# Patient Record
Sex: FEMALE | Race: WHITE | Employment: UNEMPLOYED | URBAN - NONMETROPOLITAN AREA
[De-identification: names, ages, dates, MRNs, and addresses within clinical notes are randomized per-mention and may not be internally consistent; named-entity substitution may affect disease eponyms.]

---

## 2019-08-19 ENCOUNTER — TELEPHONE (OUTPATIENT)
Dept: NEUROSURGERY | Age: 44
End: 2019-08-19

## 2019-08-20 ENCOUNTER — OFFICE VISIT (OUTPATIENT)
Dept: NEUROLOGY | Age: 44
End: 2019-08-20
Payer: MEDICAID

## 2019-08-20 VITALS
BODY MASS INDEX: 27.72 KG/M2 | HEIGHT: 71 IN | HEART RATE: 82 BPM | WEIGHT: 198 LBS | SYSTOLIC BLOOD PRESSURE: 116 MMHG | DIASTOLIC BLOOD PRESSURE: 73 MMHG

## 2019-08-20 DIAGNOSIS — R11.0 NAUSEA: ICD-10-CM

## 2019-08-20 DIAGNOSIS — G43.909 MIGRAINE WITHOUT STATUS MIGRAINOSUS, NOT INTRACTABLE, UNSPECIFIED MIGRAINE TYPE: Primary | ICD-10-CM

## 2019-08-20 DIAGNOSIS — H53.149 PHOTOPHOBIA: ICD-10-CM

## 2019-08-20 DIAGNOSIS — M54.2 PAIN, NECK: ICD-10-CM

## 2019-08-20 PROCEDURE — 99204 OFFICE O/P NEW MOD 45 MIN: CPT | Performed by: PSYCHIATRY & NEUROLOGY

## 2019-08-20 RX ORDER — DIAZEPAM 10 MG/1
5 TABLET ORAL
COMMUNITY
End: 2022-04-19 | Stop reason: ALTCHOICE

## 2019-08-20 RX ORDER — MONTELUKAST SODIUM 4 MG/1
TABLET, CHEWABLE ORAL
COMMUNITY
End: 2019-08-20

## 2019-08-20 NOTE — PROGRESS NOTES
Chief Complaint   Patient presents with    New Patient     Referred by Ping Ventura for migraines and light sensitive        St. Landryanuradha Fried is a 45y.o. year old female who is seen for evaluation of seen for intractable migraines The patient indicates that she has had migraines since her teens. Over time they have worsened. She indicates since about 2017 her headaches have significantly worsened. She does have significant stress with her kids with genetic disorders and she has been diagnosed with PTSD. She indicates she will get a severe pain over the right eye and orbit and feels like someone is stabbing her. She then has a visual aura of stars and strips which encompasses her visual field and then she loses her peripheral vision. She then has a severe migraine that lasts from 1/2 day to 4 days. She has significant photophobia all the time. She has chronic neck pain as well. She has had an MRI in 2014 and 2019 which had some minimal non specific white matter change. In 2014 she went to [facility] for evaluation of MS has her MRI had the non specific white matter change. Her workup at that time including LP was reportedly unremarkable. She has tried Maxalt which made her migraine 10 x worse. She has tried Topamax and  elavil . Active Ambulatory Problems     Diagnosis Date Noted    Migraine without status migrainosus, not intractable 08/20/2019    Photophobia 08/20/2019    Pain, neck 08/20/2019    Nausea 08/20/2019     Resolved Ambulatory Problems     Diagnosis Date Noted    No Resolved Ambulatory Problems     Past Medical History:   Diagnosis Date    Diabetes ([address]     [address]us [address] [address] [facility]     Migraine        Past Surgical History:   Procedure Laterality Date    CHOLECYSTECTOMY         History reviewed. No pertinent family history.     Allergies   Allergen Reactions    Codeine        Social History     Socioeconomic History    Marital status: Unknown     Spouse name: Not on file    Number of children: and lower extremities bilaterally    Reflexes   2+ biceps bilaterally  2+ brachioradialis  2+patella  2+ ankle jerks  No clonus ankles  No Redd's sign bilateral hands    Tremors- no tremors in hands or head noted    Gait  Normal base and speed  No ataxia    Coordination  Finger to nose-unremarkable    No results found for: CBFQZBNA12  No results found for: WBC, HGB, HCT, MCV, PLT  No results found for: NA, K, CL, CO2, BUN, CREATININE, GLUCOSE, CALCIUM, PROT, LABALBU, BILITOT, ALKPHOS, AST, ALT, LABGLOM, GFRAA, AGRATIO, GLOB        Assessment    ICD-10-CM    1. Migraine without status migrainosus, not intractable, unspecified migraine type G43.909    2. Photophobia H53.149    3. Pain, neck M54.2    4. Nausea R11.0        Her neurological examination today was unremarkable. Her MRI's of the brain which I reviewed had some minimal non specific white matter change. At this time she will be tried on Aimovig. If not covered will go to Botox next followed by nerve block and medications. She is to follow up with me in one month and call with any issues. Plan    No orders of the defined types were placed in this encounter. Orders Placed This Encounter   Medications    Erenumab-aooe (AIMOVIG) 140 MG/ML SOAJ     Sig: Inject 140 mg into the skin every 30 days     Dispense:  1 pen     Refill:  11       Return in about 3 months (around 11/20/2019). EMR Dragon/transcription disclaimer:Significant part of this  encounter note is electronic transcription/translation of spoken language to printed text. The electronic translation of spoken language may be erroneous, or at times, nonsensical words or phrases may be inadvertently transcribed.  Although I have reviewed the note for such errors, some may still exist.

## 2020-01-07 ENCOUNTER — TELEPHONE (OUTPATIENT)
Dept: NEUROLOGY | Age: 45
End: 2020-01-07

## 2020-01-07 ENCOUNTER — OFFICE VISIT (OUTPATIENT)
Dept: NEUROLOGY | Age: 45
End: 2020-01-07
Payer: MEDICAID

## 2020-01-07 VITALS
BODY MASS INDEX: 30.1 KG/M2 | DIASTOLIC BLOOD PRESSURE: 76 MMHG | HEART RATE: 76 BPM | WEIGHT: 215 LBS | HEIGHT: 71 IN | SYSTOLIC BLOOD PRESSURE: 108 MMHG

## 2020-01-07 PROCEDURE — 99214 OFFICE O/P EST MOD 30 MIN: CPT | Performed by: PSYCHIATRY & NEUROLOGY

## 2020-01-07 NOTE — TELEPHONE ENCOUNTER
Mirella Schulz (Kermit Arminda)   Rx #: [id number]   Emgality 120MG/ML auto-injectors (migraine)   Form  [facility] Electronic PA Form ([facility])   Created   22 minutes ago   Sent to Plan   6 minutes ago   Plan Response   5 minutes ago   Submit Clinical Questions   1 minute ago   Determination   Wait for Determination  Please wait for [facility] to return a determination.

## 2020-01-07 NOTE — PROGRESS NOTES
Chief Complaint   Patient presents with    Migraine     5 month follow up, pt wants to talk about botox or Heraclio Joanne is a 45y.o. year old female who is seen for evaluation of seen for intractable migraines The patient indicates that she has had migraines since her teens. Over time they have worsened. She indicates since about 2017 her headaches have significantly worsened. She does have significant stress with her kids with genetic disorders and she has been diagnosed with PTSD. She indicates she will get a severe pain over the right eye and orbit and feels like someone is stabbing her. She then has a visual aura of stars and strips which encompasses her visual field and then she loses her peripheral vision. She then has a severe migraine that lasts from 1/2 day to 4 days. She has significant photophobia all the time. She has chronic neck pain as well. She has had an MRI in 2014 and 2019 which had some minimal non specific white matter change. In 2014 she went to [facility] for evaluation of MS has her MRI had the non specific white matter change. Her workup at that time including LP was reportedly unremarkable. She has tried Maxalt which made her migraine 10 x worse. She has tried Topamax and  elavil . 16 days a month bad headaches. Samples of Emgality helped reduced intensity. Active Ambulatory Problems     Diagnosis Date Noted    Migraine without status migrainosus, not intractable 08/20/2019    Photophobia 08/20/2019    Pain, neck 08/20/2019    Nausea 08/20/2019     Resolved Ambulatory Problems     Diagnosis Date Noted    No Resolved Ambulatory Problems     Past Medical History:   Diagnosis Date    Diabetes ([address]     [facility]us [address] [address]     Migraine        Past Surgical History:   Procedure Laterality Date    CHOLECYSTECTOMY         History reviewed. No pertinent family history.     Allergies   Allergen Reactions    Codeine        Social History     Socioeconomic History    Marital status: Unknown     Spouse name: Not on file    Number of children: Not on file    Years of education: Not on file    Highest education level: Not on file   Occupational History    Not on file   Social Needs    Financial resource strain: Not on file    Food insecurity:     Worry: Not on file     Inability: Not on file    Transportation needs:     Medical: Not on file     Non-medical: Not on file   Tobacco Use    Smoking status: Current Every Day Smoker     Packs/day: 0.25    Smokeless tobacco: Never Used   Substance and Sexual Activity    Alcohol use: Yes    Drug use: Not on file    Sexual activity: Not on file   Lifestyle    Physical activity:     Days per week: Not on file     Minutes per session: Not on file    Stress: Not on file   Relationships    Social connections:     Talks on phone: Not on file     Gets together: Not on file     Attends [employer] service: Not on file     Active member of club or organization: Not on file     Attends meetings of clubs or organizations: Not on file     Relationship status: Not on file    Intimate partner violence:     Fear of current or ex partner: Not on file     Emotionally abused: Not on file     Physically abused: Not on file     Forced sexual activity: Not on file   Other Topics Concern    Not on file   Social History Narrative    Not on file       Review of Systems     Constitutional - No fever or chills. yes diaphoresis or significant fatigue. HENT -  No tinnitus or significant hearing loss. Eyes - yes sudden vision change or eye pain  Respiratory - no significant shortness of breath or cough  Cardiovascular - no chest pain No palpitations or significant leg swelling  Gastrointestinal - no abdominal swelling or pain. Genitourinary - No difficulty urinating, dysuria  Musculoskeletal - no back pain or myalgia. Skin - no color change or rash  Neurologic - No seizures. No lateralizing weakness.   Hematologic - no easy bruising or excessive

## 2020-03-09 ENCOUNTER — HOSPITAL ENCOUNTER (OUTPATIENT)
Dept: PAIN MANAGEMENT | Age: 45
Discharge: HOME | End: 2020-03-09
Payer: MEDICAID

## 2020-03-09 VITALS
DIASTOLIC BLOOD PRESSURE: 71 MMHG | TEMPERATURE: 97 F | SYSTOLIC BLOOD PRESSURE: 120 MMHG | HEART RATE: 76 BPM | RESPIRATION RATE: 18 BRPM | OXYGEN SATURATION: 98 %

## 2020-03-09 PROCEDURE — 6360000002 HC RX W HCPCS

## 2020-03-09 PROCEDURE — 64615 CHEMODENERV MUSC MIGRAINE: CPT

## 2020-03-09 PROCEDURE — 64615 CHEMODENERV MUSC MIGRAINE: CPT | Performed by: PSYCHIATRY & NEUROLOGY

## 2020-04-02 ENCOUNTER — TELEPHONE (OUTPATIENT)
Dept: NEUROLOGY | Age: 45
End: 2020-04-02

## 2020-06-01 ENCOUNTER — HOSPITAL ENCOUNTER (OUTPATIENT)
Dept: PAIN MANAGEMENT | Age: 45
Discharge: HOME | End: 2020-06-01
Payer: COMMERCIAL

## 2020-06-01 VITALS
OXYGEN SATURATION: 97 % | TEMPERATURE: 97.5 F | HEART RATE: 77 BPM | SYSTOLIC BLOOD PRESSURE: 112 MMHG | DIASTOLIC BLOOD PRESSURE: 69 MMHG | RESPIRATION RATE: 18 BRPM

## 2020-06-01 PROCEDURE — 64615 CHEMODENERV MUSC MIGRAINE: CPT

## 2020-06-01 PROCEDURE — 64615 CHEMODENERV MUSC MIGRAINE: CPT | Performed by: PSYCHIATRY & NEUROLOGY

## 2020-06-01 RX ORDER — PRAZOSIN HYDROCHLORIDE 1 MG/1
1 CAPSULE ORAL NIGHTLY
COMMUNITY
End: 2021-04-16

## 2020-06-01 NOTE — PROCEDURES
[address]    Patient Name: Claudetta Flemings    : 1975                    Age: 45 y.o. Sex: female    Date: 2020    Pre-op Diagnosis: Chronic Migraines    Post-op Diagnosis: Chronic Migraines    Procedure: Botox injections x 155 units (45 units wasted) for migraine    Performing Procedure:  Dr Merlyn Rice    Patient Vitals for the past 24 hrs:   BP Temp Temp src Pulse Resp SpO2   20 1115 112/69 97.5 °F (36.4 °C) Temporal 77 18 97 %       Previous Injections:  Patient had 9 headaches/migraines over the past month. Indications:    Claudetta Flemings has been treated for problems with chronic migraine headaches. The patient was taken through a conservative course of treatment without complete resolution of symptoms. Botox injection therapy was offered and after the risks and benefits of the procedure were explained to the patient, we had agreed to proceed. The patient was taken to the procedure room and placed in the seated position. The following muscles were identified using palpation and standard landmarks. These muscles were marked and prepped with alcohol. A total of 155 units were injected after careful aspiration and the following distribution bilaterally:  10 units in 2 sites, procerus 5 units in one site, frontalis 20 units in 4 sites, temporalis 40 unit in 8 sites, occipitals 30 units in 6 sites, cervical paraspinals 20 units in 4 sites, and trapezius 30 units in 6 sites. Discharge: The patient tolerated the procedure well. There were no complications during the procedure and the patient was discharged home with discharge instructions. The patient has been instructed to contact the office should there be any complications or questions to arise between today and their next appointment.     Plan:  [] Will return to the office in   [] 1 month  [] 4 - 6 weeks  [] 8 weeks   [x] 12 weeks:  [x] Procedure Follow-up [] Office Visit      Eun Blackwell MD, 6/1/2020 at 11:42 AM

## 2020-08-25 ENCOUNTER — TELEPHONE (OUTPATIENT)
Dept: NEUROLOGY | Age: 45
End: 2020-08-25

## 2020-08-25 RX ORDER — OXYCODONE HYDROCHLORIDE 15 MG/1
15 TABLET ORAL EVERY 4 HOURS PRN
Qty: 15 TABLET | Refills: 0 | Status: SHIPPED | OUTPATIENT
Start: 2020-08-25 | End: 2020-09-24

## 2020-08-27 RX ORDER — ONABOTULINUMTOXINA 200 [USP'U]/1
INJECTION, POWDER, LYOPHILIZED, FOR SOLUTION INTRADERMAL; INTRAMUSCULAR
Qty: 155 UNITS | Refills: 3 | Status: SHIPPED | OUTPATIENT
Start: 2020-08-27 | End: 2022-01-07

## 2020-08-31 ENCOUNTER — HOSPITAL ENCOUNTER (OUTPATIENT)
Dept: PAIN MANAGEMENT | Age: 45
Discharge: HOME | End: 2020-08-31
Payer: COMMERCIAL

## 2020-08-31 ENCOUNTER — TELEPHONE (OUTPATIENT)
Dept: PAIN MANAGEMENT | Age: 45
End: 2020-08-31

## 2020-08-31 PROCEDURE — 64615 CHEMODENERV MUSC MIGRAINE: CPT

## 2020-08-31 PROCEDURE — 64615 CHEMODENERV MUSC MIGRAINE: CPT | Performed by: PSYCHIATRY & NEUROLOGY

## 2020-08-31 RX ORDER — CHLORPROMAZINE HYDROCHLORIDE 25 MG/1
25 TABLET, FILM COATED ORAL EVERY EVENING
Qty: 30 TABLET | Refills: 5 | Status: SHIPPED | OUTPATIENT
Start: 2020-08-31

## 2020-08-31 RX ORDER — DIPHENHYDRAMINE HCL 25 MG
25 CAPSULE ORAL NIGHTLY PRN
Qty: 30 CAPSULE | Refills: 5 | Status: SHIPPED | OUTPATIENT
Start: 2020-08-31 | End: 2020-09-30

## 2020-08-31 RX ORDER — GALCANEZUMAB 120 MG/ML
120 INJECTION, SOLUTION SUBCUTANEOUS
Qty: 1 SYRINGE | Refills: 11 | Status: SHIPPED | OUTPATIENT
Start: 2020-08-31 | End: 2022-04-19 | Stop reason: ALTCHOICE

## 2020-08-31 RX ORDER — PROMETHAZINE HYDROCHLORIDE 25 MG/1
25 TABLET ORAL EVERY EVENING
Qty: 30 TABLET | Refills: 5 | Status: SHIPPED | OUTPATIENT
Start: 2020-08-31 | End: 2020-09-30

## 2020-08-31 NOTE — TELEPHONE ENCOUNTER
Call placed to [facility] in regards to patient's delivery for Botox injection. Per Ray patient has copay of 300.00 and they will need to still reach out to patient to set up new patient packet.  Patient has been scheduled for 08/31/2020 with use of a sample, will give patient Botox information to set up for reimbursement via Botox program.

## 2020-08-31 NOTE — PROCEDURES
[address]    Patient Name: Rachel Zapien    : 1975                    Age: 39 y.o. Sex: female    Date: 2020    Pre-op Diagnosis: Chronic Migraines    Post-op Diagnosis: Chronic Migraines    Procedure: Botox injections x 155 units (45 units wasted) for migraine    Performing Procedure:  Dr Naomi Arrieta    No data found. Previous Injections:  Patient had 23  headaches/migraines over the past month. Indications:    Rachel Zapien has been treated for problems with chronic migraine headaches. The patient was taken through a conservative course of treatment without complete resolution of symptoms. Botox injection therapy was offered and after the risks and benefits of the procedure were explained to the patient, we had agreed to proceed. The patient was taken to the procedure room and placed in the seated position. The following muscles were identified using palpation and standard landmarks. These muscles were marked and prepped with alcohol. A total of 155 units were injected after careful aspiration and the following distribution bilaterally:  10 units in 2 sites, procerus 5 units in one site, frontalis 20 units in 4 sites, temporalis 40 unit in 8 sites, occipitals 30 units in 6 sites, cervical paraspinals 20 units in 4 sites, and trapezius 30 units in 6 sites. Discharge: The patient tolerated the procedure well. There were no complications during the procedure and the patient was discharged home with discharge instructions. The patient has been instructed to contact the office should there be any complications or questions to arise between today and their next appointment.     Plan:  [] Will return to the office in   [] 1 month  [] 4 - 6 weeks  [] 8 weeks   [x] 12 weeks:  [x] Procedure Follow-up   [] Office Visit      Naomi Arrieta MD, 2020 at 11:27 AM

## 2020-08-31 NOTE — PROGRESS NOTES
Procedure:  Level of Consciousness: [x]Alert [x]Oriented []Disoriented []Lethargic  Anxiety Level: [x]Calm []Anxious []Depressed []Other  Skin: [x]Warm [x]Dry []Cool []Moist []Intact []Other  Cardiovascular: [x]Palpitations: [x]Never []Occasionally []Frequently  Chest Pain: [x]No []Yes  Respiratory:  [x]Unlabored []Labored []Cough ([] Productive []Unproductive)  HCG Required: [x]No []Yes   Results: []Negative []Positive  Knowledge Level:        [x]Patient/Other verbalized understanding of pre-procedure instructions. [x]Assessment of post-op care needs (transportation, responsible caregiver)        [x]Able to discuss health care problems and how to deal with it. Factors that Affect Teaching:        Language Barrier: [x]No []Yes - why:        Hearing Loss:        [x]No []Yes            Corrective Device:  []Yes []No        Vision Loss:           [x]No []Yes            Corrective Device:  []Yes []No        Memory Loss:       [x]No []Yes            []Short Term []Long Term  Motivational Level:  [x]Asks Questions                  []Extremely Anxious       [x]Seems Interested               []Seems Uninterested                  [x]Denies need for Education  Risk for Injury:  [x]Patient oriented to person, place and time  []History of frequent falls/loss of balance  Nutritional:  []Change in appetite   []Weight Gain   []Weight Loss  Functional:  []Requires assistance with ADL's      Botox Assessment  [x] Patient  has had a reduction of at least 100 hours (5 Days) in Migraines since receiving Botox  []  []  []  Factors that Affect Teaching:  Assessment of post-op care needs (transportation, responsible caregiver)        []Able to discuss health care problems and how to deal with it.   Factors that Affect Teaching:

## 2020-11-19 ENCOUNTER — TELEPHONE (OUTPATIENT)
Dept: PAIN MANAGEMENT | Age: 45
End: 2020-11-19

## 2020-11-19 NOTE — TELEPHONE ENCOUNTER
Call was placed to the patient in regards to her Botox appointment, patient wants to reschedule appointment for 11/30/2020. Patient given information to contact [facility] to schedule delivery for Botox for this appointment.

## 2020-12-07 ENCOUNTER — TELEPHONE (OUTPATIENT)
Dept: PAIN MANAGEMENT | Age: 45
End: 2020-12-07

## 2020-12-07 NOTE — TELEPHONE ENCOUNTER
Call placed to the patient in regards to patient's Botox appointments. Patient informed that she may qualify for financial assistance will mail information via [employer] and reach out to Botox to see if there is program for patient to maintain getting treatments.

## 2021-02-08 ENCOUNTER — TELEPHONE (OUTPATIENT)
Dept: NEUROLOGY | Age: 46
End: 2021-02-08

## 2021-02-22 ENCOUNTER — TELEPHONE (OUTPATIENT)
Dept: NEUROLOGY | Age: 46
End: 2021-02-22

## 2021-02-22 NOTE — TELEPHONE ENCOUNTER
Milagro requests that nurse return their call. The best time to reach her is anytime after 12 pm.    Pt called needing to discuss Botox, Emgality and PA required. Pt is having increase in migraines and would like to discuss options. Please contact to discuss. Thank you.

## 2021-02-25 NOTE — TELEPHONE ENCOUNTER
Called patient back to see what I could help her with, she is beeding a sooner follow up per migraines daily. She is also going to get the [facility] that is ready at the pharmacy. patient stated that shehas been trying to reach our office for months.  The only telephone encounter was from August. Patient is scheduled fo r4-16 to see Dr Emeli Giordano

## 2021-04-13 ENCOUNTER — TELEPHONE (OUTPATIENT)
Dept: PAIN MANAGEMENT | Age: 46
End: 2021-04-13

## 2021-04-13 NOTE — TELEPHONE ENCOUNTER
Called patient to verify insurance-Unable to reach anyone at the [address] [place] [address] number. She informed me to use [facility].  Called [facility] and they had an approval on file but they no longer deliver botox. I inquired about other pharmacies and was given [facility] among others-they were unable to tell me which plan was the cheapest.  I called and gave a verbal order for the rx for the Botox and called patient to let her know that they would be calling her for her approval/copay. She verbalized understanding.

## 2021-04-16 ENCOUNTER — TELEPHONE (OUTPATIENT)
Dept: NEUROLOGY | Age: 46
End: 2021-04-16

## 2021-04-16 ENCOUNTER — OFFICE VISIT (OUTPATIENT)
Dept: NEUROLOGY | Age: 46
End: 2021-04-16
Payer: COMMERCIAL

## 2021-04-16 VITALS
HEART RATE: 79 BPM | WEIGHT: 215 LBS | HEIGHT: 71 IN | BODY MASS INDEX: 30.1 KG/M2 | SYSTOLIC BLOOD PRESSURE: 122 MMHG | DIASTOLIC BLOOD PRESSURE: 67 MMHG

## 2021-04-16 DIAGNOSIS — H53.149 PHOTOPHOBIA: ICD-10-CM

## 2021-04-16 DIAGNOSIS — R11.0 NAUSEA: ICD-10-CM

## 2021-04-16 DIAGNOSIS — G43.719 INTRACTABLE CHRONIC MIGRAINE WITHOUT AURA AND WITHOUT STATUS MIGRAINOSUS: Primary | ICD-10-CM

## 2021-04-16 DIAGNOSIS — M54.2 PAIN, NECK: ICD-10-CM

## 2021-04-16 PROCEDURE — 99214 OFFICE O/P EST MOD 30 MIN: CPT | Performed by: PSYCHIATRY & NEUROLOGY

## 2021-04-16 RX ORDER — OXCARBAZEPINE 150 MG/1
150 TABLET, FILM COATED ORAL 2 TIMES DAILY
Qty: 60 TABLET | Refills: 2 | Status: SHIPPED | OUTPATIENT
Start: 2021-04-16 | End: 2022-04-19 | Stop reason: ALTCHOICE

## 2021-04-16 NOTE — PROGRESS NOTES
Chief Complaint   Patient presents with    Migraine     3 month follow up, pt states she has been having trouble with her memory lately. she states she is losing time and days    Other     patient is very tearful at her appointment and has constant headaches and is in the bed all the time, pt states she can't be in the sunlight without being in pain         Barbra Muñoz is a 39y.o. year old female who is seen for evaluation of seen for intractable migraines The patient indicates that she has had migraines since her teens. Over time they have worsened. She indicates since about 2017 her headaches have significantly worsened. She does have significant stress with her kids with genetic disorders and she has been diagnosed with PTSD. She indicates she will get a severe pain over the right eye and orbit and feels like someone is stabbing her. She then has a visual aura of stars and strips which encompasses her visual field and then she loses her peripheral vision. She then has a severe migraine that lasts from 1/2 day to 4 days. She has significant photophobia all the time. She has chronic neck pain as well. She has had an MRI in 2014 and 2019 which had some minimal non specific white matter change. In 2014 she went to [facility] for evaluation of MS has her MRI had the non specific white matter change. Her workup at that time including LP was reportedly unremarkable. She has tried Maxalt which made her migraine 10 x worse. She has tried Topamax and  elavil . 16 days a month bad headaches. Samples of Emgality helped reduced intensity. Daily headaches but not last as long. Lost track of time with headaches. Cocktail and Roxocodone helped. Had to call crisis center. Gets sudden sharp pain brief.      Active Ambulatory Problems     Diagnosis Date Noted    Migraine without status migrainosus, not intractable 08/20/2019    Photophobia 08/20/2019    Pain, neck 08/20/2019    Nausea 08/20/2019    Intractable chronic migraine without aura and without status migrainosus 04/16/2021     Resolved Ambulatory Problems     Diagnosis Date Noted    No Resolved Ambulatory Problems     Past Medical History:   Diagnosis Date    Diabetes ([facility] [address]     Lupus ([facility] [address]     Migraine        Past Surgical History:   Procedure Laterality Date    CHOLECYSTECTOMY         History reviewed. No pertinent family history. Allergies   Allergen Reactions    Codeine        Social History     Socioeconomic History    Marital status:      Spouse name: Not on file    Number of children: Not on file    Years of education: Not on file    Highest education level: Not on file   Occupational History    Not on file   Social Needs    Financial resource strain: Not on file    Food insecurity     Worry: Not on file     Inability: Not on file    Transportation needs     Medical: Not on file     Non-medical: Not on file   Tobacco Use    Smoking status: Current Every Day Smoker     Packs/day: 0.25    Smokeless tobacco: Never Used   Substance and Sexual Activity    Alcohol use: Yes    Drug use: Not on file    Sexual activity: Not on file   Lifestyle    Physical activity     Days per week: Not on file     Minutes per session: Not on file    Stress: Not on file   Relationships    Social connections     Talks on phone: Not on file     Gets together: Not on file     Attends Cheondoism service: Not on file     Active member of club or organization: Not on file     Attends meetings of clubs or organizations: Not on file     Relationship status: Not on file    Intimate partner violence     Fear of current or ex partner: Not on file     Emotionally abused: Not on file     Physically abused: Not on file     Forced sexual activity: Not on file   Other Topics Concern    Not on file   Social History Narrative    Not on file     Review of Systems     Constitutional  No fever or chills. No diaphoresis or significant fatigue.   HENT   No tinnitus or significant hearing loss. Eyes  no sudden vision change or eye pain  Respiratory  no significant shortness of breath or cough  Cardiovascular  no chest pain No palpitations or significant leg swelling  Gastrointestinal  no abdominal swelling or pain. Genitourinary  No difficulty urinating, dysuria  Musculoskeletal  no back pain or myalgia. Skin  no color change or rash  Neurologic  No seizures. No lateralizing weakness. Hematologic  no easy bruising or excessive bleeding. Psychiatric  no severe anxiety or nervousness. All other review of systems are negative. Current Outpatient Medications   Medication Sig Dispense Refill    Cariprazine HCl (VRAYLAR PO) Take by mouth      OXcarbazepine (TRILEPTAL) 150 MG tablet Take 1 tablet by mouth 2 times daily 60 tablet 2    Ubrogepant 100 MG TABS Take 100 mg by mouth as needed (max 2 in 24 hours) 10 tablet 11    Galcanezumab-gnlm (EMGALITY) 120 MG/ML SOSY Inject 120 mg into the skin every 30 days 2 first month and then one a month 1 Syringe 11    chlorproMAZINE (THORAZINE) 25 MG tablet Take 1 tablet by mouth every evening 30 tablet 5    diazepam (VALIUM) 10 MG tablet 5 mg.  Dulaglutide (TRULICITY) 1.5 YM/2.6AG SOPN 0.5 mLs      Onabotulinumtoxin A (BOTOX) 200 units injection 155 UNITS IM IN CERVICAL AND FACIAL REGION EVERY 90 DAYS (Patient not taking: Reported on 4/16/2021) 155 Units 3     No current facility-administered medications for this visit. /67   Pulse 79   Ht 5' 11\" (1.803 m)   Wt 215 lb (97.5 kg)   BMI 29.99 kg/m²       Constitutional  well developed, well nourished.     Eyes  conjunctiva normal.  Ear, nose, throat - No scars, masses, or lesions over external nose or ears, no atrophy of tongue  Neck-symmetric, no masses noted, no jugular vein distension  Respiration- chest wall appears symmetric, good expansion,   normal effort without use of accessory muscles  Musculoskeletal  no significant wasting of muscles noted, no bony deformities  Extremities-no clubbing, cyanosis or edema  Skin  warm, dry, and intact. No rash, erythema, or pallor. Psychiatric  mood, affect, and behavior appear normal.      Neurological exam  Awake, alert, fluent oriented  appropriate affect  Attention and concentration appear appropriate  Recent and remote memory appears unremarkable  Speech normal without dysarthria  No clear issues with language of fund of knowledge    Cranial Nerve Exam     CN III, IV,VI-EOMI, No nystagmus, conjugate eye movements, no ptosis  CN VII-no facial assymetry        Motor Exam  antigravity throughout upper and lower extremities bilaterally    Tremors- no tremors in hands or head noted    Gait  Normal base and speed  No ataxia  No results found for: [id number]  No results found for: WBC, HGB, HCT, MCV, PLT  No results found for: NA, K, CL, CO2, BUN, CREATININE, GLUCOSE, CALCIUM, PROT, LABALBU, BILITOT, ALKPHOS, AST, ALT, LABGLOM, GFRAA, AGRATIO, GLOB        Assessment    ICD-10-CM    1. Intractable chronic migraine without aura and without status migrainosus  G43.719 MRI BRAIN WO CONTRAST     MRI BRAIN WO CONTRAST   2. Photophobia  H53.149    3. Pain, neck  M54.2    4. Nausea  R11.0        Her neurological examination today was unremarkable. Her MRI's of the brain which I reviewed had some minimal non specific white matter change. I suspect she has trigeminal neuralgia as well. At this time she will be taken off Emgality for about 2 months to see if helps her mood. She weill be tried on [place]. She will continue with Botox and nerve block. She is to follow up with me in 6 weeks and call with any issues.       Plan    Orders Placed This Encounter   Procedures    MRI BRAIN WO CONTRAST    MRI BRAIN WO CONTRAST       Orders Placed This Encounter   Medications    OXcarbazepine (TRILEPTAL) 150 MG tablet     Sig: Take 1 tablet by mouth 2 times daily     Dispense:  60 tablet     Refill:  2    Ubrogepant 100 MG TABS     Sig: Take 100 mg by mouth as needed (max 2 in 24 hours)     Dispense:  10 tablet     Refill:  11       Return in about 6 weeks (around 5/28/2021). EMR Dragon/transcription disclaimer:Significant part of this  encounter note is electronic transcription/translation of spoken language to printed text. The electronic translation of spoken language may be erroneous, or at times, nonsensical words or phrases may be inadvertently transcribed.  Although I have reviewed the note for such errors, some may still exist.

## 2021-04-16 NOTE — TELEPHONE ENCOUNTER
Called spoke with patient about her mri appointment ,  And location , patient is aware of the mri and appointment with Dr Jay Serrato .

## 2021-04-20 ENCOUNTER — TELEPHONE (OUTPATIENT)
Dept: PAIN MANAGEMENT | Age: 46
End: 2021-04-20

## 2021-04-20 ENCOUNTER — TELEPHONE (OUTPATIENT)
Dept: NEUROLOGY | Age: 46
End: 2021-04-20

## 2021-04-20 RX ORDER — ONABOTULINUMTOXINA 200 [USP'U]/1
155 INJECTION, POWDER, LYOPHILIZED, FOR SOLUTION INTRADERMAL; INTRAMUSCULAR ONCE
Qty: 155 UNITS | Refills: 3 | Status: SHIPPED | OUTPATIENT
Start: 2021-04-20 | End: 2021-04-20

## 2021-04-20 NOTE — TELEPHONE ENCOUNTER
Jassi Perez (Azeem Castellon)   Rx #: [id number]   Ubrelvy 100MG tablets    Message from [employer]  Your PA request has been approved. Additional information will be provided in the approval communication. (Message [phone])       Called the patient and left her a message letting her know.

## 2021-04-20 NOTE — TELEPHONE ENCOUNTER
Patient called wanting to know the status of her Leonila PA. I don't see where we've been notified a PA is needed prior to today.

## 2021-04-28 ENCOUNTER — TELEPHONE (OUTPATIENT)
Dept: PAIN MANAGEMENT | Age: 46
End: 2021-04-28

## 2021-04-28 NOTE — TELEPHONE ENCOUNTER
Call placed to [facility] to be delivered per [facility].   Patient notified

## 2021-04-28 NOTE — TELEPHONE ENCOUNTER
Call received from [facility] regarding her Botox. She stated she had called the pharmacy and they stated they were just waiting for us to call and order the medication. I have called them to schedule delivery just now-spoke with Bruna-transferred to Miriam Escalera. Discussed botox delivery and having it overnighted. Then she returned back to the phone to tell me claim was rejected. She never got me a pharmacist but said she had them change rx to 31 days (this was why the claim was rejected) and to call back later this afternoon to schedule delivery/confirm it was correct. I let her know I never received a call back from her pharmacy regarding this and was under the impression that this was taken care of when I called them last week on 4/20 and that I was awaiting a phone call back Aly Bautista confirming they had spoken with patient to confirm delivery before I ordered this. Noone ever called this office,  I will call back in a couple hours as instructed.   I have also called the patient to let her know all this

## 2021-04-30 ENCOUNTER — HOSPITAL ENCOUNTER (OUTPATIENT)
Dept: MRI IMAGING | Age: 46
Discharge: HOME | End: 2021-04-30
Payer: COMMERCIAL

## 2021-04-30 DIAGNOSIS — G43.719 INTRACTABLE CHRONIC MIGRAINE WITHOUT AURA AND WITHOUT STATUS MIGRAINOSUS: ICD-10-CM

## 2021-04-30 PROCEDURE — 70551 MRI BRAIN STEM W/O DYE: CPT

## 2021-05-03 ENCOUNTER — HOSPITAL ENCOUNTER (OUTPATIENT)
Dept: PAIN MANAGEMENT | Age: 46
Discharge: HOME | End: 2021-05-03
Payer: COMMERCIAL

## 2021-05-03 VITALS
OXYGEN SATURATION: 100 % | TEMPERATURE: 97.4 F | DIASTOLIC BLOOD PRESSURE: 76 MMHG | HEART RATE: 72 BPM | RESPIRATION RATE: 18 BRPM | SYSTOLIC BLOOD PRESSURE: 126 MMHG

## 2021-05-03 PROCEDURE — 64615 CHEMODENERV MUSC MIGRAINE: CPT

## 2021-05-03 PROCEDURE — 64615 CHEMODENERV MUSC MIGRAINE: CPT | Performed by: PSYCHIATRY & NEUROLOGY

## 2021-05-03 NOTE — PROGRESS NOTES
Patient states that he/she has __23____ headaches out of 30 days each month.   Patient states that he/she has ___12__ migraines out of 30 days each month.monthly

## 2021-05-03 NOTE — PROGRESS NOTES
Procedure:  Level of Consciousness: [x]Alert []Oriented []Disoriented []Lethargic  Anxiety Level: [x]Calm []Anxious []Depressed []Other  Skin: [x]Warm [x]Dry []Cool []Moist []Intact []Other  Cardiovascular: [x]Palpitations: []Never []Occasionally []Frequently  Chest Pain: [x]No []Yes  Respiratory:  [x]Unlabored []Labored []Cough ([] Productive []Unproductive)  HCG Required: [x]No []Yes   Results: []Negative []Positive  Knowledge Level:        [x]Patient/Other verbalized understanding of pre-procedure instructions. [x]Assessment of post-op care needs (transportation, responsible caregiver)        [x]Able to discuss health care problems and how to deal with it.   Factors that Affect Teaching:        Language Barrier: [x]No []Yes - why:        Hearing Loss:        [x]No []Yes            Corrective Device:  []Yes []No        Vision Loss:           [x]No []Yes            Corrective Device:  []Yes [x]No        Memory Loss:       [x]No []Yes            []Short Term []Long Term  Motivational Level:  [x]Asks Questions                  []Extremely Anxious       []Seems Interested               []Seems Uninterested                  []Denies need for Education  Risk for Injury:  []Patient oriented to person, place and time  []History of frequent falls/loss of balance  Nutritional:  []Change in appetite   []Weight Gain   []Weight Loss  Functional:  []Requires assistance with ADL's

## 2021-05-03 NOTE — PROCEDURES
[address]    Patient Name: Dioni Borrero    : 1975                    Age: 39 y.o. Sex: female    Date: May 3, 2021    Pre-op Diagnosis: Chronic Migraines    Post-op Diagnosis: Chronic Migraines    Procedure: Botox injections x 155 units (45 units wasted) for migraine    Performing Procedure:  Dr Darien Mix for the past 24 hrs:   BP Temp Temp src Pulse Resp SpO2   21 1034 126/76 97.4 °F (36.3 °C) Temporal 72 18 100 %       Previous Injections:  Patient had 2 migraines and 10 headaches over the past month. At least 100 units or greater of Botox was used. The patient had reduction in migraines and was able to increase their activity level post treatment with Botox    Indications:    Dioni Borrero has been treated for problems with chronic migraine headaches. The patient was taken through a conservative course of treatment without complete resolution of symptoms. Botox injection therapy was offered and after the risks and benefits of the procedure were explained to the patient, we had agreed to proceed. The patient was taken to the procedure room and placed in the seated position. The following muscles were identified using palpation and standard landmarks. These muscles were marked and prepped with alcohol. A total of 155 units were injected after careful aspiration and the following distribution bilaterally:  10 units in 2 sites, procerus 5 units in one site, frontalis 20 units in 4 sites, temporalis 40 unit in 8 sites, occipitals 30 units in 6 sites, cervical paraspinals 20 units in 4 sites, and trapezius 30 units in 6 sites. Discharge: The patient tolerated the procedure well. There were no complications during the procedure and the patient was discharged home with discharge instructions.     The patient has been instructed to contact the office should there be any complications or questions to arise between today and their next appointment.     Plan:  [] Will return to the office in   [] 1 month  [] 4 - 6 weeks  [] 8 weeks   [x] 12 weeks:  [x] Procedure Follow-up   [] Office Visit      Jenel Blizzard, MD, 5/3/2021 at 11:37 AM

## 2021-07-02 ENCOUNTER — TELEMEDICINE (OUTPATIENT)
Dept: NEUROLOGY | Age: 46
End: 2021-07-02
Payer: COMMERCIAL

## 2021-07-02 DIAGNOSIS — R11.0 NAUSEA: ICD-10-CM

## 2021-07-02 DIAGNOSIS — H53.149 PHOTOPHOBIA: ICD-10-CM

## 2021-07-02 DIAGNOSIS — G43.719 INTRACTABLE CHRONIC MIGRAINE WITHOUT AURA AND WITHOUT STATUS MIGRAINOSUS: Primary | ICD-10-CM

## 2021-07-02 DIAGNOSIS — G43.909 MIGRAINE WITHOUT STATUS MIGRAINOSUS, NOT INTRACTABLE, UNSPECIFIED MIGRAINE TYPE: ICD-10-CM

## 2021-07-02 DIAGNOSIS — M54.2 PAIN, NECK: ICD-10-CM

## 2021-07-02 PROCEDURE — 99214 OFFICE O/P EST MOD 30 MIN: CPT | Performed by: PSYCHIATRY & NEUROLOGY

## 2021-07-02 RX ORDER — DIVALPROEX SODIUM 500 MG/1
TABLET, EXTENDED RELEASE ORAL
Qty: 60 TABLET | Refills: 5 | Status: SHIPPED | OUTPATIENT
Start: 2021-07-02 | End: 2022-04-19 | Stop reason: ALTCHOICE

## 2021-07-02 NOTE — PROGRESS NOTES
Chief Complaint   Patient presents with    Migraine     follow up       Derik Gaines is a 39y.o. year old female who is seen for evaluation of seen for intractable migraines The patient indicates that she has had migraines since her teens. Over time they have worsened. She indicates since about 2017 her headaches have significantly worsened. She does have significant stress with her kids with genetic disorders and she has been diagnosed with PTSD. She indicates she will get a severe pain over the right eye and orbit and feels like someone is stabbing her. She then has a visual aura of stars and strips which encompasses her visual field and then she loses her peripheral vision. She then has a severe migraine that lasts from 1/2 day to 4 days. She has significant photophobia all the time. She has chronic neck pain as well. She has had an MRI in 2014 and 2019 which had some minimal non specific white matter change. In 2014 she went to [facility] for evaluation of MS has her MRI had the non specific white matter change. Her workup at that time including LP was reportedly unremarkable. She has tried Maxalt which made her migraine 10 x worse. She has tried Topamax and  elavil . 16 days a month bad headaches. Samples of Emgality helped reduced intensity. Daily headaches but not last as long. Lost track of time with headaches. Cocktail and Roxocodone helped. Had to call crisis center. Gets sudden sharp pain brief. Ubrevly really helps. If take it early then helps but still will confusion. Trileptal not help.      Active Ambulatory Problems     Diagnosis Date Noted    Migraine without status migrainosus, not intractable 08/20/2019    Photophobia 08/20/2019    Pain, neck 08/20/2019    Nausea 08/20/2019    Intractable chronic migraine without aura and without status migrainosus 04/16/2021     Resolved Ambulatory Problems     Diagnosis Date Noted    No Resolved Ambulatory Problems     Past Medical History: unremarkable. Her MRI's of the brain which I reviewed had some minimal non specific white matter change. I suspect she has trigeminal neuralgia as well. At this time she will be taken off Emgality and Trileptal. She will be started on Depakote. She was advised to watch her mood very carefully. She will continue Uvrelvy. She will continue with Botox and nerve block. She is to follow up with me in 3 months and call with any issues. Telephone call 15 minutes  Both parties in [facility] to E&M services      Plan    No orders of the defined types were placed in this encounter. Orders Placed This Encounter   Medications    divalproex (DEPAKOTE ER) 500 MG extended release tablet     Si tab a day     Dispense:  60 tablet     Refill:  5       Return in about 3 months (around 10/2/2021). EMR Dragon/transcription disclaimer:Significant part of this  encounter note is electronic transcription/translation of spoken language to printed text. The electronic translation of spoken language may be erroneous, or at times, nonsensical words or phrases may be inadvertently transcribed.  Although I have reviewed the note for such errors, some may still exist.

## 2021-07-07 ENCOUNTER — TELEPHONE (OUTPATIENT)
Dept: PAIN MANAGEMENT | Age: 46
End: 2021-07-07

## 2021-07-19 ENCOUNTER — HOSPITAL ENCOUNTER (OUTPATIENT)
Dept: PAIN MANAGEMENT | Age: 46
Discharge: HOME | End: 2021-07-19
Payer: COMMERCIAL

## 2021-07-19 VITALS
OXYGEN SATURATION: 97 % | RESPIRATION RATE: 1 BRPM | TEMPERATURE: 96 F | HEART RATE: 7 BPM | DIASTOLIC BLOOD PRESSURE: 69 MMHG | SYSTOLIC BLOOD PRESSURE: 125 MMHG

## 2021-07-19 PROCEDURE — 64615 CHEMODENERV MUSC MIGRAINE: CPT | Performed by: PSYCHIATRY & NEUROLOGY

## 2021-07-19 PROCEDURE — 64615 CHEMODENERV MUSC MIGRAINE: CPT

## 2021-07-19 NOTE — PROCEDURES
[address]    Patient Name: Norma Bellamy    : 1975                    Age: 39 y.o. Sex: female    Date: 2021    Pre-op Diagnosis: Chronic Migraines    Post-op Diagnosis: Chronic Migraines    Procedure: Botox injections x 155 units (45 units wasted) for migraine    Performing Procedure:  Dr Pj Elise for the past 24 hrs:   BP Temp Temp src Pulse Resp SpO2   21 1000 125/69 96 °F (35.6 °C) Temporal (!) 7 (!) 1 97 %       Previous Injections:  Patient had 11 migraines and 10 headaches over the past month. At least 100 units or greater of Botox was used. The patient had reduction in migraines and was able to increase their activity level post treatment with Botox    Indications:    Norma Bellamy has been treated for problems with chronic migraine headaches. The patient was taken through a conservative course of treatment without complete resolution of symptoms. Botox injection therapy was offered and after the risks and benefits of the procedure were explained to the patient, we had agreed to proceed. The patient was taken to the procedure room and placed in the seated position. The following muscles were identified using palpation and standard landmarks. These muscles were marked and prepped with alcohol. A total of 155 units were injected after careful aspiration and the following distribution bilaterally:  10 units in 2 sites, procerus 5 units in one site, frontalis 20 units in 4 sites, temporalis 40 unit in 8 sites, occipitals 30 units in 6 sites, cervical paraspinals 20 units in 4 sites, and trapezius 30 units in 6 sites. Discharge: The patient tolerated the procedure well. There were no complications during the procedure and the patient was discharged home with discharge instructions.     The patient has been instructed to contact the office should there be any complications or questions to arise between today and their next appointment.     Plan:  [] Will return to the office in   [] 1 month  [] 4 - 6 weeks  [] 8 weeks   [x] 12 weeks:  [x] Procedure Follow-up   [] Office Visit      Meghana Rolon MD, 7/19/2021 at 10:24 AM

## 2021-07-19 NOTE — PROGRESS NOTES
Procedure:  Level of Consciousness: [x]Alert [x]Oriented []Disoriented []Lethargic  Anxiety Level: [x]Calm []Anxious []Depressed []Other  Skin: []Warm [x]Dry []Cool []Moist []Intact []Other  Cardiovascular: [x]Palpitations: [x]Never []Occasionally []Frequently  Chest Pain: [x]No []Yes  Respiratory:  [x]Unlabored []Labored []Cough ([] Productive []Unproductive)  HCG Required: [x]No []Yes   Results: []Negative []Positive  Knowledge Level:        [x]Patient/Other verbalized understanding of pre-procedure instructions. [x]Assessment of post-op care needs (transportation, responsible caregiver)        [x]Able to discuss health care problems and how to deal with it. Factors that Affect Teaching:        Language Barrier: [x]No []Yes - why:        Hearing Loss:        [x]No []Yes            Corrective Device:  []Yes []No        Vision Loss:           [x]No []Yes            Corrective Device:  []Yes []No        Memory Loss:       [x]No []Yes            []Short Term []Long Term  Motivational Level:  [x]Asks Questions                  []Extremely Anxious       [x]Seems Interested               []Seems Uninterested                  [x]Denies need for Education  Risk for Injury:  [x]Patient oriented to person, place and time  []History of frequent falls/loss of balance  Nutritional:  []Change in appetite   []Weight Gain   []Weight Loss  Functional:  []Requires assistance with ADL's      Botox Assessment  [] Patient  has had a reduction of at least 100 hours (5 Days) in Migraines since receiving Botox  []  []  []  Factors that Affect Teaching:  Assessment of post-op care needs (transportation, responsible caregiver)        []Able to discuss health care problems and how to deal with it. Factors that Affect Teaching:Patient states that he/she has _Clusters_____ headaches out of 30 days each month.   Patient states that he/she has __12____ migraines out of 30 days each month.monthly

## 2021-09-08 ENCOUNTER — TELEPHONE (OUTPATIENT)
Dept: PAIN MANAGEMENT | Age: 46
End: 2021-09-08

## 2021-09-08 NOTE — TELEPHONE ENCOUNTER
Spoke with [facility] regarding the fax I received stating the patient is no longer covered for botox and needing a new PA. I have not received any notice of this besides a fax from [facility].   They are sending/will be sending the paperwork for the PA questions to this fax

## 2021-09-23 ENCOUNTER — TELEPHONE (OUTPATIENT)
Dept: PAIN MANAGEMENT | Age: 46
End: 2021-09-23

## 2021-09-23 NOTE — TELEPHONE ENCOUNTER
SPOKE WITH [facility]. THEY GAVE ME THE NUMBER TO [facility] FOR PA PROCESS. I CALLED THEM AND GAVE CLINICALS OVER THE PHONE TO VANNESSA AT [phone].   THE PENDING PA NUMBER IS [id number]

## 2021-09-27 ENCOUNTER — TELEPHONE (OUTPATIENT)
Dept: PAIN MANAGEMENT | Age: 46
End: 2021-09-27

## 2021-10-11 ENCOUNTER — HOSPITAL ENCOUNTER (OUTPATIENT)
Dept: PAIN MANAGEMENT | Age: 46
Discharge: HOME | End: 2021-10-11
Payer: COMMERCIAL

## 2021-10-11 VITALS
TEMPERATURE: 96.9 F | SYSTOLIC BLOOD PRESSURE: 110 MMHG | DIASTOLIC BLOOD PRESSURE: 53 MMHG | OXYGEN SATURATION: 98 % | RESPIRATION RATE: 18 BRPM | HEART RATE: 69 BPM

## 2021-10-11 PROCEDURE — 64615 CHEMODENERV MUSC MIGRAINE: CPT | Performed by: PSYCHIATRY & NEUROLOGY

## 2021-10-11 PROCEDURE — 64615 CHEMODENERV MUSC MIGRAINE: CPT

## 2021-10-11 NOTE — PROCEDURES
[address]    Patient Name: Jeannette Baird    : 1975                    Age: 42 y.o. Sex: female    Date: 2021    Pre-op Diagnosis: Chronic Migraines    Post-op Diagnosis: Chronic Migraines    Procedure: Botox injections x 155 units (45 units wasted) for migraine    Performing Procedure:  Dr Jeremiah Dasilva    Patient Vitals for the past 24 hrs:   BP Temp Temp src Pulse Resp SpO2   10/11/21 1029 (!) 110/53 96.9 °F (36.1 °C) Temporal 69 18 98 %       Previous Injections:  Patient had 5 migraines and 7 headaches over the past month. At least 100 units or greater of Botox was used. The patient had reduction in migraines and was able to increase their activity level post treatment with Botox    Indications:    Jeannette Baird has been treated for problems with chronic migraine headaches. The patient was taken through a conservative course of treatment without complete resolution of symptoms. Botox injection therapy was offered and after the risks and benefits of the procedure were explained to the patient, we had agreed to proceed. The patient was taken to the procedure room and placed in the seated position. The following muscles were identified using palpation and standard landmarks. These muscles were marked and prepped with alcohol. A total of 155 units were injected after careful aspiration and the following distribution bilaterally:  10 units in 2 sites, procerus 5 units in one site, frontalis 20 units in 4 sites, temporalis 40 unit in 8 sites, occipitals 30 units in 6 sites, cervical paraspinals 20 units in 4 sites, and trapezius 30 units in 6 sites. Discharge: The patient tolerated the procedure well. There were no complications during the procedure and the patient was discharged home with discharge instructions.     The patient has been instructed to contact the office should there be any complications or questions to arise between today and their next appointment.     Plan:  [] Will return to the office in   [] 1 month  [] 4 - 6 weeks  [] 8 weeks   [x] 12 weeks:  [x] Procedure Follow-up   [] Office Visit      Chinita Bamberger, MD, 10/11/2021 at 11:02 AM

## 2021-10-19 ENCOUNTER — TELEMEDICINE (OUTPATIENT)
Dept: NEUROLOGY | Age: 46
End: 2021-10-19
Payer: COMMERCIAL

## 2021-10-19 DIAGNOSIS — R11.0 NAUSEA: ICD-10-CM

## 2021-10-19 DIAGNOSIS — H53.149 PHOTOPHOBIA: ICD-10-CM

## 2021-10-19 DIAGNOSIS — M54.2 PAIN, NECK: ICD-10-CM

## 2021-10-19 DIAGNOSIS — G43.719 INTRACTABLE CHRONIC MIGRAINE WITHOUT AURA AND WITHOUT STATUS MIGRAINOSUS: Primary | ICD-10-CM

## 2021-10-19 DIAGNOSIS — G43.909 MIGRAINE WITHOUT STATUS MIGRAINOSUS, NOT INTRACTABLE, UNSPECIFIED MIGRAINE TYPE: ICD-10-CM

## 2021-10-19 PROCEDURE — 99214 OFFICE O/P EST MOD 30 MIN: CPT | Performed by: PSYCHIATRY & NEUROLOGY

## 2021-10-19 NOTE — PROGRESS NOTES
Chief Complaint   Patient presents with    Migraine     follow up        Murvin Bosworth is a 42y.o. year old female who is seen for evaluation of seen for intractable migraines The patient indicates that she has had migraines since her teens. Over time they have worsened. She indicates since about 2017 her headaches have significantly worsened. She does have significant stress with her kids with genetic disorders and she has been diagnosed with PTSD. She indicates she will get a severe pain over the right eye and orbit and feels like someone is stabbing her. She then has a visual aura of stars and strips which encompasses her visual field and then she loses her peripheral vision. She then has a severe migraine that lasts from 1/2 day to 4 days. She has significant photophobia all the time. She has chronic neck pain as well. She has had an MRI in 2014 and 2019 which had some minimal non specific white matter change. In 2014 she went to [facility] for evaluation of MS has her MRI had the non specific white matter change. Her workup at that time including LP was reportedly unremarkable. She has tried Maxalt which made her migraine 10 x worse. She has tried Topamax and  elavil . 16 days a month bad headaches. Samples of Emgality helped reduced intensity. Daily headaches but not last as long. Lost track of time with headaches. Cocktail and Roxocodone helped. Had to call crisis center. Gets sudden sharp pain brief. Ubrevly really helps. If take it early then helps but still will confusion. Trileptal not help. Headaches much better with Botox. [place] helps. Just on Ubrelvy and Botox. Much better and about 9 headaches a month.      Active Ambulatory Problems     Diagnosis Date Noted    Migraine without status migrainosus, not intractable 08/20/2019    Photophobia 08/20/2019    Pain, neck 08/20/2019    Nausea 08/20/2019    Intractable chronic migraine without aura and without status migrainosus 04/16/2021 Resolved Ambulatory Problems     Diagnosis Date Noted    No Resolved Ambulatory Problems     Past Medical History:   Diagnosis Date    Diabetes ([facility] [address]     Lupus ([facility] [address]     Migraine        Past Surgical History:   Procedure Laterality Date    CHOLECYSTECTOMY         History reviewed. No pertinent family history. Allergies   Allergen Reactions    Codeine        Social History     Socioeconomic History    Marital status:      Spouse name: Not on file    Number of children: Not on file    Years of education: Not on file    Highest education level: Not on file   Occupational History    Not on file   Tobacco Use    Smoking status: Current Every Day Smoker     Packs/day: 0.25    Smokeless tobacco: Never Used   Substance and Sexual Activity    Alcohol use: Yes    Drug use: Not on file    Sexual activity: Not on file   Other Topics Concern    Not on file   Social History Narrative    Not on file     Social Determinants of Health     Financial Resource Strain:     Difficulty of Paying Living Expenses:    Food Insecurity:     Worried About Running Out of Food in the Last Year:     [address] in the Last Year:    Transportation Needs:     Lack of Transportation (Medical):  Lack of Transportation (Non-Medical):    Physical Activity:     Days of Exercise per Week:     Minutes of Exercise per Session:    Stress:     Feeling of Stress :    Social Connections:     Frequency of Communication with Friends and Family:     Frequency of Social Gatherings with Friends and Family:     Attends Taoist Services:     Active Member of Clubs or Organizations:     Attends Club or Organization Meetings:     Marital Status:    Intimate Partner Violence:     Fear of Current or Ex-Partner:     Emotionally Abused:     Physically Abused:     Sexually Abused:      Review of Systems     Constitutional - No fever or chills. No diaphoresis or significant fatigue.   HENT -  No tinnitus or significant hearing loss. Eyes - no sudden vision change or eye pain  Respiratory - no significant shortness of breath or cough  Cardiovascular - no chest pain No palpitations or significant leg swelling  Gastrointestinal - no abdominal swelling or pain. Genitourinary - No difficulty urinating, dysuria  Musculoskeletal - no back pain or myalgia. Skin - no color change or rash  Neurologic - No seizures. No lateralizing weakness. Hematologic - no easy bruising or excessive bleeding. Psychiatric - no severe anxiety or nervousness. All other review of systems are negative.       Current Outpatient Medications   Medication Sig Dispense Refill    divalproex (DEPAKOTE ER) 500 MG extended release tablet 2 tab a day 60 tablet 5    Cariprazine HCl (VRAYLAR PO) Take by mouth      OXcarbazepine (TRILEPTAL) 150 MG tablet Take 1 tablet by mouth 2 times daily 60 tablet 2    Ubrogepant 100 MG TABS Take 100 mg by mouth as needed (max 2 in 24 hours) 10 tablet 11    Galcanezumab-gnlm (EMGALITY) 120 MG/ML SOSY Inject 120 mg into the skin every 30 days 2 first month and then one a month 1 Syringe 11    chlorproMAZINE (THORAZINE) 25 MG tablet Take 1 tablet by mouth every evening 30 tablet 5    Onabotulinumtoxin A (BOTOX) 200 units injection 155 UNITS IM IN CERVICAL AND FACIAL REGION EVERY 90 DAYS 155 Units 3    diazepam (VALIUM) 10 MG tablet 5 mg.  Dulaglutide (TRULICITY) 1.5 UD/9.2FA SOPN 0.5 mLs       No current facility-administered medications for this visit. There were no vitals taken for this visit. No results found for: [id number]  No results found for: WBC, HGB, HCT, MCV, PLT  No results found for: NA, K, CL, CO2, BUN, CREATININE, GLUCOSE, CALCIUM, PROT, LABALBU, BILITOT, ALKPHOS, AST, ALT, LABGLOM, GFRAA, AGRATIO, GLOB        Assessment    ICD-10-CM    1. Intractable chronic migraine without aura and without status migrainosus  G43.719    2. Photophobia  H53.149    3. Pain, neck  M54.2    4. Nausea  R11.0    5. Migraine without status migrainosus, not intractable, unspecified migraine type  G43.904        Her neurological examination today was unremarkable. Her MRI's of the brain which I reviewed had some minimal non specific white matter change. I suspect she has trigeminal neuralgia as well. At this time she will be taken off Emgality and Trileptal. She will be started on Katheleen Eth. She will continue Uvrelvy. She will continue with Botox . No longer gets nerve blocks. She is to follow up with me in 6 months and call with any issues. Telephone call 15 minutes  Both parties in [facility] to E&M services      Plan    No orders of the defined types were placed in this encounter. No orders of the defined types were placed in this encounter. Return in about 6 months (around 4/19/2022). EMR Dragon/transcription disclaimer:Significant part of this  encounter note is electronic transcription/translation of spoken language to printed text. The electronic translation of spoken language may be erroneous, or at times, nonsensical words or phrases may be inadvertently transcribed.  Although I have reviewed the note for such errors, some may still exist.

## 2021-11-15 ENCOUNTER — TELEPHONE (OUTPATIENT)
Dept: NEUROLOGY | Age: 46
End: 2021-11-15

## 2021-11-15 NOTE — TELEPHONE ENCOUNTER
Spoke with the patient and she didn't have questions about any paperwork. She was checking the status of the records release for her attorneys office. I told her that I could see the request scanned in and our records department has 30 days from the 5th to get the records sent. I did tell her since her court date is this Friday that I would send the first and last office visit to them so they would have that since the signed release is in her chart. She gave a verbal understanding. I faxed them through Epic.

## 2021-12-02 ENCOUNTER — TELEPHONE (OUTPATIENT)
Dept: NEUROLOGY | Age: 46
End: 2021-12-02

## 2021-12-02 NOTE — TELEPHONE ENCOUNTER
Has been approved for the bridge program for  Wilfredo Navarrete .  Wanting to know If we have received the papers,    Lazarus Grizzle said call if you had questions

## 2021-12-29 ENCOUNTER — TELEPHONE (OUTPATIENT)
Dept: PAIN MANAGEMENT | Age: 46
End: 2021-12-29

## 2022-01-03 ENCOUNTER — HOSPITAL ENCOUNTER (OUTPATIENT)
Dept: PAIN MANAGEMENT | Age: 47
Discharge: HOME | End: 2022-01-03
Payer: COMMERCIAL

## 2022-01-03 VITALS
TEMPERATURE: 96.5 F | RESPIRATION RATE: 18 BRPM | DIASTOLIC BLOOD PRESSURE: 61 MMHG | OXYGEN SATURATION: 97 % | SYSTOLIC BLOOD PRESSURE: 126 MMHG | HEART RATE: 75 BPM

## 2022-01-03 PROCEDURE — 64615 CHEMODENERV MUSC MIGRAINE: CPT | Performed by: PSYCHIATRY & NEUROLOGY

## 2022-01-03 PROCEDURE — 64615 CHEMODENERV MUSC MIGRAINE: CPT

## 2022-01-03 NOTE — PROGRESS NOTES
Patient states that he/she has ___2___ headaches out of 30 days each month.   Patient states that he/she has ___4___ migraines out of 30 days each month.monthly

## 2022-01-03 NOTE — PROCEDURES
[address]    Patient Name: Stefany Mcbride    : 1975                    Age: 42 y.o. Sex: female    Date: January 3, 2022    Pre-op Diagnosis: Chronic Migraines    Post-op Diagnosis: Chronic Migraines    Procedure: Botox injections x 155 units (45 units wasted) for migraine    Performing Procedure:  Dr Knight Heads for the past 24 hrs:   BP Temp Temp src Pulse Resp SpO2   22 0906 126/61 96.5 °F (35.8 °C) Temporal 75 18 97 %       Previous Injections:  Patient had 4 migraines and 2 headaches over the past month. At least 100 units or greater of Botox was used. The patient had reduction in migraines and was able to increase their activity level post treatment with Botox    Indications:    Stefany Mcbride has been treated for problems with chronic migraine headaches. The patient was taken through a conservative course of treatment without complete resolution of symptoms. Botox injection therapy was offered and after the risks and benefits of the procedure were explained to the patient, we had agreed to proceed. The patient was taken to the procedure room and placed in the seated position. The following muscles were identified using palpation and standard landmarks. These muscles were marked and prepped with alcohol. A total of 155 units were injected after careful aspiration and the following distribution bilaterally:  10 units in 2 sites, procerus 5 units in one site, frontalis 20 units in 4 sites, temporalis 40 unit in 8 sites, occipitals 30 units in 6 sites, cervical paraspinals 20 units in 4 sites, and trapezius 30 units in 6 sites. Discharge: The patient tolerated the procedure well. There were no complications during the procedure and the patient was discharged home with discharge instructions.     The patient has been instructed to contact the office should there be any complications or questions to arise between today and their next appointment.     Plan:  [] Will return to the office in   [] 1 month  [] 4 - 6 weeks  [] 8 weeks   [x] 12 weeks:  [x] Procedure Follow-up   [] Office Visit      Mortimer Bidding, MD, 1/3/2022 at 10:12 AM

## 2022-01-07 RX ORDER — ONABOTULINUMTOXINA 200 [USP'U]/1
INJECTION, POWDER, LYOPHILIZED, FOR SOLUTION INTRADERMAL; INTRAMUSCULAR
Qty: 1 EACH | Refills: 3 | Status: SHIPPED | OUTPATIENT
Start: 2022-01-07

## 2022-01-07 NOTE — TELEPHONE ENCOUNTER
Joryjem Akbar has requested a refill on her medication. Last office visit : 10/19/2021   Next office visit : 4/19/2022         Requested Prescriptions     Pending Prescriptions Disp Refills    BOTOX 200 units injection [Pharmacy Med Name: BOTOX 200 UNIT SDV PWD] 1 each 3     Sig: INJECT 155 UNITS INTO THE MUSCLES OF FACIAL REGION EVERY 90 DAYS. DISCARD UNUSED PORTION. Awake/Alert

## 2022-01-19 RX ORDER — ATOGEPANT 60 MG/1
TABLET ORAL
Qty: 30 TABLET | Refills: 11 | Status: SHIPPED | OUTPATIENT
Start: 2022-01-19

## 2022-01-19 NOTE — TELEPHONE ENCOUNTER
Oscar Victor has requested a refill on her medication. Last office visit : 10/19/2021   Next office visit : 4/19/2022   Last medication refill :Patient approved for bridge program and [facility] is requesting a script in order to dispense to the patient please. Requested Prescriptions     Pending Prescriptions Disp Refills    Atogepant (QULIPTA) 60 MG TABS 30 tablet 11     Sig: Take 1 tablet by mouth daily     **Dr Yani Maddox patient please.

## 2022-03-28 ENCOUNTER — HOSPITAL ENCOUNTER (OUTPATIENT)
Dept: PAIN MANAGEMENT | Age: 47
Discharge: HOME | End: 2022-03-28
Payer: COMMERCIAL

## 2022-03-28 VITALS
SYSTOLIC BLOOD PRESSURE: 115 MMHG | DIASTOLIC BLOOD PRESSURE: 66 MMHG | TEMPERATURE: 96.9 F | HEART RATE: 79 BPM | OXYGEN SATURATION: 97 % | RESPIRATION RATE: 18 BRPM

## 2022-03-28 PROCEDURE — A4216 STERILE WATER/SALINE, 10 ML: HCPCS

## 2022-03-28 PROCEDURE — 64615 CHEMODENERV MUSC MIGRAINE: CPT | Performed by: PSYCHIATRY & NEUROLOGY

## 2022-03-28 PROCEDURE — 64615 CHEMODENERV MUSC MIGRAINE: CPT

## 2022-03-28 PROCEDURE — 2580000003 HC RX 258

## 2022-03-28 RX ORDER — SODIUM CHLORIDE 0.9 % (FLUSH) 0.9 %
5 SYRINGE (ML) INJECTION ONCE
Status: DISCONTINUED | OUTPATIENT
Start: 2022-03-28 | End: 2022-03-30 | Stop reason: HOSPADM

## 2022-03-28 NOTE — PROGRESS NOTES
Procedure:  Level of Consciousness: [x]Alert [x]Oriented []Disoriented []Lethargic  Anxiety Level: [x]Calm []Anxious []Depressed []Other  Skin: [x]Warm [x]Dry []Cool []Moist []Intact []Other  Cardiovascular: [x]Palpitations: [x]Never []Occasionally []Frequently  Chest Pain: [x]No []Yes  Respiratory:  [x]Unlabored []Labored []Cough ([] Productive []Unproductive)  HCG Required: [x]No []Yes   Results: []Negative []Positive  Knowledge Level:        [x]Patient/Other verbalized understanding of pre-procedure instructions. [x]Assessment of post-op care needs (transportation, responsible caregiver)        [x]Able to discuss health care problems and how to deal with it. Factors that Affect Teaching:        Language Barrier: [x]No []Yes - why:        Hearing Loss:        [x]No []Yes            Corrective Device:  []Yes []No        Vision Loss:           [x]No []Yes            Corrective Device:  []Yes []No        Memory Loss:       [x]No []Yes            []Short Term []Long Term  Motivational Level:  [x]Asks Questions                  []Extremely Anxious       [x]Seems Interested               []Seems Uninterested                  [x]Denies need for Education  Risk for Injury:  [x]Patient oriented to person, place and time  []History of frequent falls/loss of balance  Nutritional:  []Change in appetite   []Weight Gain   []Weight Loss  Functional:  []Requires assistance with ADL's      Botox Assessment  [] Patient  has had a reduction of at least 100 hours (5 Days) in Migraines since receiving Botox  []  []  []  Factors that Affect Teaching:  Assessment of post-op care needs (transportation, responsible caregiver)        []Able to discuss health care problems and how to deal with it. Factors that Affect Teaching:Patient states that he/she has _4_____ headaches out of 30 days each month.   Patient states that he/she has __3____ migraines out of 30 days each month.monthly

## 2022-03-28 NOTE — PROCEDURES
[address]    Patient Name: Stefany Mcbride    : 1975                    Age: 42 y.o. Sex: female    Date: 2022    Pre-op Diagnosis: Chronic Migraines    Post-op Diagnosis: Chronic Migraines    Procedure: Botox injections x 155 units (45 units wasted) for migraine    Performing Procedure:  Dr Knight Heads for the past 24 hrs:   BP Temp Temp src Pulse Resp SpO2   22 0909 115/66 96.9 °F (36.1 °C) Temporal 79 18 97 %       Previous Injections:  Patient had 3 migraines and 4 headaches over the past month. At least 100 units or greater of Botox was used. The patient had reduction in migraines and was able to increase their activity level post treatment with Botox    Indications:    Stefany Mcbride has been treated for problems with chronic migraine headaches. The patient was taken through a conservative course of treatment without complete resolution of symptoms. Botox injection therapy was offered and after the risks and benefits of the procedure were explained to the patient, we had agreed to proceed. The patient was taken to the procedure room and placed in the seated position. The following muscles were identified using palpation and standard landmarks. These muscles were marked and prepped with alcohol. A total of 155 units were injected after careful aspiration and the following distribution bilaterally:  10 units in 2 sites, procerus 5 units in one site, frontalis 20 units in 4 sites, temporalis 40 unit in 8 sites, occipitals 30 units in 6 sites, cervical paraspinals 20 units in 4 sites, and trapezius 30 units in 6 sites. Discharge: The patient tolerated the procedure well. There were no complications during the procedure and the patient was discharged home with discharge instructions.     The patient has been instructed to contact the office should there be any complications or questions to arise between today and their next appointment.     Plan:  [] Will return to the office in   [] 1 month  [] 4 - 6 weeks  [] 8 weeks   [x] 12 weeks:  [x] Procedure Follow-up   [] Office Visit      Jennifer Zavala MD, 3/28/2022 at 9:55 AM

## 2022-04-19 ENCOUNTER — TELEMEDICINE (OUTPATIENT)
Dept: NEUROLOGY | Age: 47
End: 2022-04-19
Payer: COMMERCIAL

## 2022-04-19 DIAGNOSIS — R11.0 NAUSEA: ICD-10-CM

## 2022-04-19 DIAGNOSIS — G43.909 MIGRAINE WITHOUT STATUS MIGRAINOSUS, NOT INTRACTABLE, UNSPECIFIED MIGRAINE TYPE: ICD-10-CM

## 2022-04-19 DIAGNOSIS — H53.149 PHOTOPHOBIA: ICD-10-CM

## 2022-04-19 DIAGNOSIS — M54.2 PAIN, NECK: ICD-10-CM

## 2022-04-19 DIAGNOSIS — G43.719 INTRACTABLE CHRONIC MIGRAINE WITHOUT AURA AND WITHOUT STATUS MIGRAINOSUS: Primary | ICD-10-CM

## 2022-04-19 PROCEDURE — 99214 OFFICE O/P EST MOD 30 MIN: CPT | Performed by: PSYCHIATRY & NEUROLOGY

## 2022-04-19 NOTE — PROGRESS NOTES
Chief Complaint   Patient presents with    Migraine     follow up        Kirsty Palacio is a 42y.o. year old female who is seen for evaluation of seen for intractable migraines The patient indicates that she has had migraines since her teens. Over time they have worsened. She indicates since about 2017 her headaches have significantly worsened. She does have significant stress with her kids with genetic disorders and she has been diagnosed with PTSD. She indicates she will get a severe pain over the right eye and orbit and feels like someone is stabbing her. She then has a visual aura of stars and strips which encompasses her visual field and then she loses her peripheral vision. She then has a severe migraine that lasts from 1/2 day to 4 days. She has significant photophobia all the time. She has chronic neck pain as well. She has had an MRI in 2014 and 2019 which had some minimal non specific white matter change. In 2014 she went to [facility] for evaluation of MS has her MRI had the non specific white matter change. Her workup at that time including LP was reportedly unremarkable. She has tried Maxalt which made her migraine 10 x worse. She has tried Topamax , Depakote, Neurontin, Lyrica, Cybaltaand  elavil . 16 days a month bad headaches. Samples of Emgality helped reduced intensity. Lost track of time with headaches. Cocktail and Roxocodone helped. Previously she had to call crisis center. Gets sudden sharp pain brief. Ubrevly really helps. If take it early then helps but still with confusion. Trileptal not help. Headaches much better with Botox. Janice Cruz helps. On Ubrelvy, Qulipta, Motrin  and Botox. Much better and about 12 headaches a month.      Active Ambulatory Problems     Diagnosis Date Noted    Migraine without status migrainosus, not intractable 08/20/2019    Photophobia 08/20/2019    Pain, neck 08/20/2019    Nausea 08/20/2019    Intractable chronic migraine without aura and without status migrainosus 04/16/2021     Resolved Ambulatory Problems     Diagnosis Date Noted    No Resolved Ambulatory Problems     Past Medical History:   Diagnosis Date    Diabetes ([facility]     Lupus ([facility]     Migraine        Past Surgical History:   Procedure Laterality Date    CHOLECYSTECTOMY         No family history on file. Allergies   Allergen Reactions    Codeine        Social History     Socioeconomic History    Marital status:      Spouse name: Not on file    Number of children: Not on file    Years of education: Not on file    Highest education level: Not on file   Occupational History    Not on file   Tobacco Use    Smoking status: Current Every Day Smoker     Packs/day: 0.25    Smokeless tobacco: Never Used   Substance and Sexual Activity    Alcohol use: Yes    Drug use: Not on file    Sexual activity: Not on file   Other Topics Concern    Not on file   Social History Narrative    Not on file     Social Determinants of Health     Financial Resource Strain:     Difficulty of Paying Living Expenses: Not on file   Food Insecurity:     Worried About Running Out of Food in the Last Year: Not on file    Brayan of Food in the Last Year: Not on file   Transportation Needs:     Lack of Transportation (Medical): Not on file    Lack of Transportation (Non-Medical):  Not on file   Physical Activity:     Days of Exercise per Week: Not on file    Minutes of Exercise per Session: Not on file   Stress:     Feeling of Stress : Not on file   Social Connections:     Frequency of Communication with Friends and Family: Not on file    Frequency of Social Gatherings with Friends and Family: Not on file    Attends Anglican Services: Not on file    Active Member of Clubs or Organizations: Not on file    Attends Club or Organization Meetings: Not on file    Marital Status: Not on file   Intimate Partner Violence:     Fear of Current or Ex-Partner: Not on file    Emotionally Abused: Not on file    Physically Abused: Not on file    Sexually Abused: Not on file   Housing Stability:     Unable to Pay for Housing in the Last Year: Not on file    Number of Places Lived in the Last Year: Not on file    Unstable Housing in the Last Year: Not on file     Review of Systems     Constitutional - No fever or chills. No diaphoresis or significant fatigue. HENT -  No tinnitus or significant hearing loss. Eyes - no sudden vision change or eye pain  Respiratory - no significant shortness of breath or cough  Cardiovascular - no chest pain No palpitations or significant leg swelling  Gastrointestinal - no abdominal swelling or pain. Genitourinary - No difficulty urinating, dysuria  Musculoskeletal - no back pain or myalgia. Skin - no color change or rash  Neurologic - No seizures. No lateralizing weakness. Hematologic - no easy bruising or excessive bleeding. Psychiatric - no severe anxiety or nervousness. All other review of systems are negative.       Current Outpatient Medications   Medication Sig Dispense Refill    Atogepant (QULIPTA) 60 MG TABS Take 1 tablet by mouth daily 30 tablet 11    BOTOX 200 units injection INJECT 155 UNITS INTO THE MUSCLES OF FACIAL REGION EVERY 90 DAYS. DISCARD UNUSED PORTION. 1 each 3    Cariprazine HCl (VRAYLAR PO) Take by mouth      Ubrogepant 100 MG TABS Take 100 mg by mouth as needed (max 2 in 24 hours) 10 tablet 11    chlorproMAZINE (THORAZINE) 25 MG tablet Take 1 tablet by mouth every evening 30 tablet 5    Dulaglutide (TRULICITY) 1.5 WL/8.0CS SOPN 0.5 mLs       No current facility-administered medications for this visit. There were no vitals taken for this visit. No results found for: [id number]  No results found for: WBC, HGB, HCT, MCV, PLT  No results found for: NA, K, CL, CO2, BUN, CREATININE, GLUCOSE, CALCIUM, PROT, LABALBU, BILITOT, ALKPHOS, AST, ALT, LABGLOM, GFRAA, AGRATIO, GLOB    Constitutional - well developed, well nourished.     Eyes - conjunctiva normal.  Ear, nose, throat - No scars, masses, or lesions over external nose or ears, no atrophy of tongue  Neck-symmetric, no masses noted, no jugular vein distension  Respiration- chest wall appears symmetric, good expansion,   normal effort without use of accessory muscles  Musculoskeletal - no significant wasting of muscles noted, no bony deformities  Extremities-no clubbing, cyanosis or edema  Skin - warm, dry, and intact. No rash, erythema, or pallor. Psychiatric - mood, affect, and behavior appear normal.      Neurological exam  Awake, alert, fluent oriented  appropriate affect  Attention and concentration appear appropriate  Recent and remote memory appears unremarkable  Speech normal without dysarthria  No clear issues with language of fund of knowledge    Cranial Nerve Exam     CN III, IV,VI-EOMI, No nystagmus, conjugate eye movements, no ptosis  CN VII-no facial assymetry        Motor Exam  antigravity throughout upper and lower extremities bilaterally    Tremors- no tremors in hands or head noted        Assessment    ICD-10-CM    1. Intractable chronic migraine without aura and without status migrainosus  G43.719    2. Photophobia  H53.149    3. Pain, neck  M54.2    4. Nausea  R11.0    5. Migraine without status migrainosus, not intractable, unspecified migraine type  G43.909        Her neurological examination initially was unremarkable. Her MRI's of the brain which I reviewed had some minimal non specific white matter change. I suspect she has trigeminal neuralgia as well. She is off Emgality and Trileptal. She will be continued on [place] along with Botox . No longer gets nerve blocks. She did not wish to initiate receive any new medications for headache at this time. She is to follow up with me in 6 months and call with any issues.     Tele video call 15 minutes  Both parties in [facility] to [facility] services    Lucio Horowitz, was evaluated through a synchronous (real-time) audio-video   encounter. The patient (or guardian if applicable) is aware that this is a billable   service, which includes applicable co-pays. This Virtual Visit was conducted with   patient's (and/or legal guardian's) consent. The visit was conducted pursuant to   the emergency declaration under the [facility] [place], [address] [place] [address] authority and the [employer] and   [facility] General Act. Patient identification was verified,   and a caregiver was present when appropriate. The patient was located in a   state where the provider was licensed to provide care. Plan    No orders of the defined types were placed in this encounter. No orders of the defined types were placed in this encounter. Return in about 6 months (around 10/19/2022). EMR Dragon/transcription disclaimer:Significant part of this  encounter note is electronic transcription/translation of spoken language to printed text. The electronic translation of spoken language may be erroneous, or at times, nonsensical words or phrases may be inadvertently transcribed.  Although I have reviewed the note for such errors, some may still exist.

## 2022-06-20 ENCOUNTER — HOSPITAL ENCOUNTER (OUTPATIENT)
Dept: PAIN MANAGEMENT | Age: 47
Discharge: HOME | End: 2022-06-20
Payer: COMMERCIAL

## 2022-06-20 VITALS
OXYGEN SATURATION: 98 % | SYSTOLIC BLOOD PRESSURE: 99 MMHG | RESPIRATION RATE: 18 BRPM | HEART RATE: 77 BPM | DIASTOLIC BLOOD PRESSURE: 57 MMHG | TEMPERATURE: 96.6 F

## 2022-06-20 PROCEDURE — A4216 STERILE WATER/SALINE, 10 ML: HCPCS

## 2022-06-20 PROCEDURE — 64615 CHEMODENERV MUSC MIGRAINE: CPT

## 2022-06-20 PROCEDURE — 2580000003 HC RX 258

## 2022-06-20 PROCEDURE — 64615 CHEMODENERV MUSC MIGRAINE: CPT | Performed by: PSYCHIATRY & NEUROLOGY

## 2022-06-20 RX ORDER — SODIUM CHLORIDE 0.9 % (FLUSH) 0.9 %
5 SYRINGE (ML) INJECTION ONCE
Status: DISCONTINUED | OUTPATIENT
Start: 2022-06-20 | End: 2022-06-22 | Stop reason: HOSPADM

## 2022-06-20 NOTE — PROCEDURES
[address]    Patient Name: Altagracia Gerber    : 1975                    Age: 42 y.o. Sex: female    Date: 2022    Pre-op Diagnosis: Chronic Migraines    Post-op Diagnosis: Chronic Migraines    Procedure: Botox injections x 155 units (45 units wasted) for migraine    Performing Procedure:  Dr Rupesh Burt    Patient Vitals for the past 24 hrs:   BP Temp Temp src Pulse Resp SpO2   22 0922 (!) 99/57 (!) 96.6 °F (35.9 °C) Temporal 77 18 98 %       Previous Injections:  Patient had 14 migraines and 14 headaches over the past month. At least 100 units or greater of Botox was used. The patient had reduction in migraines and was able to increase their activity level post treatment with Botox    Indications:    Altagracia Gerber has been treated for problems with chronic migraine headaches. The patient was taken through a conservative course of treatment without complete resolution of symptoms. Botox injection therapy was offered and after the risks and benefits of the procedure were explained to the patient, we had agreed to proceed. The patient was taken to the procedure room and placed in the seated position. The following muscles were identified using palpation and standard landmarks. These muscles were marked and prepped with alcohol. A total of 155 units were injected after careful aspiration and the following distribution bilaterally:  10 units in 2 sites, procerus 5 units in one site, frontalis 20 units in 4 sites, temporalis 40 unit in 8 sites, occipitals 30 units in 6 sites, cervical paraspinals 20 units in 4 sites, and trapezius 30 units in 6 sites. Discharge: The patient tolerated the procedure well. There were no complications during the procedure and the patient was discharged home with discharge instructions.     The patient has been instructed to contact the office should there be any complications or questions to arise between today and their next appointment.     Plan:  [] Will return to the office in   [] 1 month  [] 4 - 6 weeks  [] 8 weeks   [x] 12 weeks:  [x] Procedure Follow-up   [] Office Visit      Jenna Pond MD, 6/20/2022 at 9:47 AM

## 2022-06-20 NOTE — PROGRESS NOTES
Patient states that he/she has _14_____ headaches out of 30 days each month.   Patient states that he/she has __14____ migraines out of 30 days each month.monthly

## 2022-09-12 ENCOUNTER — TELEPHONE (OUTPATIENT)
Dept: PAIN MANAGEMENT | Age: 47
End: 2022-09-12

## 2022-09-12 ENCOUNTER — HOSPITAL ENCOUNTER (OUTPATIENT)
Dept: PAIN MANAGEMENT | Age: 47
Discharge: HOME | End: 2022-09-12
Payer: COMMERCIAL

## 2022-09-12 VITALS
SYSTOLIC BLOOD PRESSURE: 91 MMHG | OXYGEN SATURATION: 100 % | TEMPERATURE: 96.7 F | HEART RATE: 80 BPM | DIASTOLIC BLOOD PRESSURE: 69 MMHG | RESPIRATION RATE: 18 BRPM

## 2022-09-12 PROCEDURE — 64615 CHEMODENERV MUSC MIGRAINE: CPT | Performed by: PSYCHIATRY & NEUROLOGY

## 2022-09-12 PROCEDURE — 2580000003 HC RX 258

## 2022-09-12 PROCEDURE — 64615 CHEMODENERV MUSC MIGRAINE: CPT

## 2022-09-12 PROCEDURE — A4216 STERILE WATER/SALINE, 10 ML: HCPCS

## 2022-09-12 RX ORDER — SODIUM CHLORIDE 0.9 % (FLUSH) 0.9 %
5 SYRINGE (ML) INJECTION ONCE
Status: DISCONTINUED | OUTPATIENT
Start: 2022-09-12 | End: 2022-09-14 | Stop reason: HOSPADM

## 2022-09-12 NOTE — DISCHARGE INSTRUCTIONS
[address] [place] [address] Physical And Pain Medicine  Post Procedure Discharge Instructions        YOU HAVE HAD THE FOLLOWING PROCEDURE:                                  [] Occipital Nerve Blocks  [] CTS wrist injection(s)  [] Knee Injection(s)         [] Shoulder Injection(s)   [] Elbow Injection(s)     [x] Botox Injection  [] Cervical Trigger Point Injections    [] Thoracic Trigger Point Injections    [] Lumbar Trigger Point Injections  [] Piriformis Trigger Point Injections  [] SI Joint Injection(s)     [] Trochanteric Bursa Injection(s)       [] Ankle Injection(s)   [] Plantar Fasciitis   []  ______________  Injection(s) [x] Botox [x]  Migraines [] Spasticity    YOU HAVE RECEIVED THE FOLLOWING MEDICATIONS IN YOUR INJECTION(s)  [] Lidocaine [] Bupivacaine   [] DepoMedrol (steroid) [] Decadron (steroid)  []  Kenalog (steroid)   [] Toradol  [] Supartz [] Jared Kenning    [x] Botox        PATIENT INFORMATION:   You may experience the following symptoms after your procedure. These symptoms are normal and should not cause concern: You may have an increase in your pain. This may last 24 - 48 hours after your procedure. You may have no change in the pain that you had prior to your injection(s). You may have weakness or numbness in your affected extremity. If this occurs, this may last until numbing the medication wears off. REPORT THE FOLLOWING SYMPTOMS TO YOUR DOCTOR:  Redness, swelling or drainage at the injection site(s)  Unusual pain that interferes with your normal activities of daily living. OTHER INSTRUCTIONS:    [] I will apply ice to the injection site(s) for at least 24 hours after the procedure. I will rotate the ice on for 20 minutes and off for 20 minutes for at least 24 hours. [] I will not apply heat for at least 48 hours and I will not take a hot bath or shower for at least 24 hours.      [] I understand that if Lidocaine or Bupivacaine was used in my injection(s) that the injection site(s) will be numb for a few hours after the procedure    [] I understand if a steroid was used it will take effect in 3 - 7 days. I understand that as the numbing medication wears off, the pain may return until the steroids start working. [] I understand that today I will rest for the next 24 hours and drink plenty of water. [x] For Botox for Migraines please do not wear anything constricting around the forehead for 7-10 days post injection. Examples headband, hats, or bandana    [] For Botox for Spasticity start therapy for the affected limb in two weeks. [] Additional instructions: ______________________________________________ ___________________________________________________________________    Sedation:  Was oral sedation given? [] Yes  [x] No    I understand that if I took an oral nerve calming medication I will not drive for  [] 24 hours after taking the medication.     [x] I have received a copy of my discharge instructions and understand the above instructions to the best of my knowledge    Patient Discharged:  [x] Home  [] Hospital  [] Other  ____________________________________________    Via:  [x] Ambulatory  [] Wheelchair   [] Stretcher [] EMS       Accompanied By:   [] Significant other  [] Family Member  [] Friend   [] Hospital Staff  []  Ambulance Staff  [x] Other_______________________________________________    Plan:  [] Will return to the office in   [] 1 month   [] 3 months for:  [] Procedure Follow-up  [x] Office Visit   [x] Planned Procedure      Patient Signature: _____________________________________________________    Staff Signature: _______________________________________________________        If you have questions, problems or concerns you may call [phone] and follow the prompts

## 2022-09-12 NOTE — PROGRESS NOTES
Procedure:  Level of Consciousness: [x]Alert [x]Oriented []Disoriented []Lethargic  Anxiety Level: [x]Calm []Anxious []Depressed []Other  Skin: []Warm [x]Dry []Cool []Moist []Intact []Other  Cardiovascular: [x]Palpitations: [x]Never []Occasionally []Frequently  Chest Pain: [x]No []Yes  Respiratory:  [x]Unlabored []Labored []Cough ([] Productive []Unproductive)  HCG Required: [x]No []Yes   Results: []Negative []Positive  Knowledge Level:        [x]Patient/Other verbalized understanding of pre-procedure instructions. [x]Assessment of post-op care needs (transportation, responsible caregiver)        [x]Able to discuss health care problems and how to deal with it. Factors that Affect Teaching:        Language Barrier: [x]No []Yes - why:        Hearing Loss:        [x]No []Yes            Corrective Device:  []Yes []No        Vision Loss:           [x]No []Yes            Corrective Device:  []Yes []No        Memory Loss:       [x]No []Yes            []Short Term []Long Term  Motivational Level:  [x]Asks Questions                  []Extremely Anxious       [x]Seems Interested               []Seems Uninterested                  [x]Denies need for Education  Risk for Injury:  [x]Patient oriented to person, place and time  []History of frequent falls/loss of balance  Nutritional:  []Change in appetite   []Weight Gain   []Weight Loss  Functional:  []Requires assistance with ADL's      Botox Assessment  [] Patient  has had a reduction of at least 100 hours (5 Days) in Migraines since receiving Botox  []  []  []  Factors that Affect Teaching:  Assessment of post-op care needs (transportation, responsible caregiver)        []Able to discuss health care problems and how to deal with it. Factors that Affect Teaching: Patient states that he/she has __4____ headaches out of 30 days each month.   Patient states that he/she has __6____ migraines out of 30 days each month.monthly

## 2022-09-12 NOTE — TELEPHONE ENCOUNTER
It was brought to my attention this am that [facility] called to inform her and someone else answered her phone and stated she would call me back. I called the  [facility] [address] [place] [address] and spoke with Jennyfer Dan. She stated that everything was good to go on their end. I explained that I never received a call to schedule delivery from them and confirmed that was the process. She apologized and stated that it was.   They will deliver the med tomorrow

## 2022-09-12 NOTE — PROCEDURES
[address]    Patient Name: Yumi Delgado    : 1975                    Age: 41 y.o. Sex: female    Date: 2022    Pre-op Diagnosis: Chronic Migraines    Post-op Diagnosis: Chronic Migraines    Procedure: Botox injections x 155 units (45 units wasted) for migraine    Performing Procedure:  Dr Gurmeet Reid    Patient Vitals for the past 24 hrs:   BP Temp Temp src Pulse Resp   22 1043 91/69 (!) 96.7 °F (35.9 °C) Temporal 80 18       Previous Injections:  Patient had 6 migraines and 4 headaches over the past month. At least 100 units or greater of Botox was used. The patient had reduction in migraines and was able to increase their activity level post treatment with Botox    Indications:    Yumi Delgado has been treated for problems with chronic migraine headaches. The patient was taken through a conservative course of treatment without complete resolution of symptoms. Botox injection therapy was offered and after the risks and benefits of the procedure were explained to the patient, we had agreed to proceed. The patient was taken to the procedure room and placed in the seated position. The following muscles were identified using palpation and standard landmarks. These muscles were marked and prepped with alcohol. A total of 155 units were injected after careful aspiration and the following distribution bilaterally:  10 units in 2 sites, procerus 5 units in one site, frontalis 20 units in 4 sites, temporalis 40 unit in 8 sites, occipitals 30 units in 6 sites, cervical paraspinals 20 units in 4 sites, and trapezius 30 units in 6 sites. Discharge: The patient tolerated the procedure well. There were no complications during the procedure and the patient was discharged home with discharge instructions.     The patient has been instructed to contact the office should there be any complications or questions to arise between today and their next appointment.     Plan:  [] Will return to the office in   [] 1 month  [] 4 - 6 weeks  [] 8 weeks   [x] 12 weeks:  [x] Procedure Follow-up   [] Office Visit      Chinita Bamberger, MD, 9/12/2022 at 10:45 AM

## 2022-10-04 ENCOUNTER — TELEPHONE (OUTPATIENT)
Dept: NEUROLOGY | Age: 47
End: 2022-10-04

## 2022-10-04 NOTE — TELEPHONE ENCOUNTER
Called and left patient a VM to let her know that her appointment for 10-18-22 with Dr. Trenton Silva had to be rescheduled due to the provider is out of the office. Left on VM of when I have her appointment rescheduled too.

## 2022-11-16 ENCOUNTER — OFFICE VISIT (OUTPATIENT)
Dept: NEUROLOGY | Age: 47
End: 2022-11-16
Payer: COMMERCIAL

## 2022-11-16 VITALS
HEART RATE: 79 BPM | SYSTOLIC BLOOD PRESSURE: 114 MMHG | BODY MASS INDEX: 30.1 KG/M2 | WEIGHT: 215 LBS | DIASTOLIC BLOOD PRESSURE: 72 MMHG | HEIGHT: 71 IN

## 2022-11-16 DIAGNOSIS — G43.909 MIGRAINE WITHOUT STATUS MIGRAINOSUS, NOT INTRACTABLE, UNSPECIFIED MIGRAINE TYPE: ICD-10-CM

## 2022-11-16 DIAGNOSIS — H53.149 PHOTOPHOBIA: ICD-10-CM

## 2022-11-16 DIAGNOSIS — G43.719 INTRACTABLE CHRONIC MIGRAINE WITHOUT AURA AND WITHOUT STATUS MIGRAINOSUS: Primary | ICD-10-CM

## 2022-11-16 DIAGNOSIS — M54.2 PAIN, NECK: ICD-10-CM

## 2022-11-16 DIAGNOSIS — R11.0 NAUSEA: ICD-10-CM

## 2022-11-16 PROCEDURE — 99213 OFFICE O/P EST LOW 20 MIN: CPT | Performed by: PSYCHIATRY & NEUROLOGY

## 2022-11-16 RX ORDER — IBUPROFEN 800 MG/1
800 TABLET ORAL 2 TIMES DAILY PRN
Qty: 90 TABLET | Refills: 11 | Status: SHIPPED | OUTPATIENT
Start: 2022-11-16

## 2022-11-16 NOTE — PROGRESS NOTES
without aura and without status migrainosus 04/16/2021     Resolved Ambulatory Problems     Diagnosis Date Noted    No Resolved Ambulatory Problems     Past Medical History:   Diagnosis Date    Diabetes ([facility] [address]     Lupus ([facility] [address]     Migraine        Past Surgical History:   Procedure Laterality Date    CHOLECYSTECTOMY         No family history on file. Allergies   Allergen Reactions    Codeine        Social History     Socioeconomic History    Marital status:      Spouse name: Not on file    Number of children: Not on file    Years of education: Not on file    Highest education level: Not on file   Occupational History    Not on file   Tobacco Use    Smoking status: Every Day     Packs/day: 0.25     Types: Cigarettes    Smokeless tobacco: Never   Substance and Sexual Activity    Alcohol use: Yes    Drug use: Not on file    Sexual activity: Not on file   Other Topics Concern    Not on file   Social History Narrative    Not on file     Social Determinants of Health     Financial Resource Strain: Not on file   Food Insecurity: Not on file   Transportation Needs: Not on file   Physical Activity: Not on file   Stress: Not on file   Social Connections: Not on file   Intimate Partner Violence: Not on file   Housing Stability: Not on file     Review of Systems     Constitutional - No fever or chills. No diaphoresis or significant fatigue. HENT -  No tinnitus or significant hearing loss. Eyes - no sudden vision change or eye pain  Respiratory - no significant shortness of breath or cough  Cardiovascular - no chest pain No palpitations or significant leg swelling  Gastrointestinal - no abdominal swelling or pain. Genitourinary - No difficulty urinating, dysuria  Musculoskeletal - no back pain or myalgia. Skin - no color change or rash  Neurologic - No seizures. No lateralizing weakness. Hematologic - no easy bruising or excessive bleeding. Psychiatric - no severe anxiety or nervousness.    All other review of systems are negative. Current Outpatient Medications   Medication Sig Dispense Refill    ibuprofen (ADVIL;MOTRIN) 800 MG tablet Take 1 tablet by mouth 2 times daily as needed for Pain 90 tablet 11    Atogepant (QULIPTA) 60 MG TABS Take 1 tablet by mouth daily 30 tablet 11    BOTOX 200 units injection INJECT 155 UNITS INTO THE MUSCLES OF FACIAL REGION EVERY 90 DAYS. DISCARD UNUSED PORTION. 1 each 3    Cariprazine HCl (VRAYLAR PO) Take by mouth      Ubrogepant 100 MG TABS Take 100 mg by mouth as needed (max 2 in 24 hours) 10 tablet 11    chlorproMAZINE (THORAZINE) 25 MG tablet Take 1 tablet by mouth every evening 30 tablet 5    dulaglutide (TRULICITY) 1.5 AG/8.5DJ SC injection 0.5 mLs       No current facility-administered medications for this visit. /72   Pulse 79   Ht 5' 11\" (1.803 m)   Wt 215 lb (97.5 kg)   BMI 29.99 kg/m²       No results found for: AFIJCYSQ67  No results found for: WBC, HGB, HCT, MCV, PLT  No results found for: NA, K, CL, CO2, BUN, CREATININE, GLUCOSE, CALCIUM, PROT, LABALBU, BILITOT, ALKPHOS, AST, ALT, LABGLOM, GFRAA, AGRATIO, GLOB    Constitutional - well developed, well nourished. Eyes - conjunctiva normal.  Ear, nose, throat - No scars, masses, or lesions over external nose or ears, no atrophy of tongue  Neck-symmetric, no masses noted, no jugular vein distension  Respiration- chest wall appears symmetric, good expansion,   normal effort without use of accessory muscles  Musculoskeletal - no significant wasting of muscles noted, no bony deformities  Extremities-no clubbing, cyanosis or edema  Skin - warm, dry, and intact. No rash, erythema, or pallor.   Psychiatric - mood, affect, and behavior appear normal.      Neurological exam  Awake, alert, fluent oriented  appropriate affect  Attention and concentration appear appropriate  Recent and remote memory appears unremarkable  Speech normal without dysarthria  No clear issues with language of fund of knowledge    Cranial Nerve Exam     CN III, IV,VI-EOMI, No nystagmus, conjugate eye movements, no ptosis  CN VII-no facial assymetry        Motor Exam  antigravity throughout upper and lower extremities bilaterally    Tremors- no tremors in hands or head noted        Assessment    ICD-10-CM    1. Intractable chronic migraine without aura and without status migrainosus  G43.719       2. Photophobia  H53.149       3. Pain, neck  M54.2       4. Nausea  R11.0       5. Migraine without status migrainosus, not intractable, unspecified migraine type  G43.909             Her neurological examination initially was unremarkable. Her MRI's of the brain which I reviewed had some minimal non specific white matter change. I suspect she has trigeminal neuralgia as well. She is off Emgality and Trileptal. She will be continued on [place] along with Botox . No longer gets nerve blocks. She did not wish to initiate receive any new medications for headache at this time. She is to follow up with me in 6 months and call with any issues. Continue current care. Plan    No orders of the defined types were placed in this encounter. Orders Placed This Encounter   Medications    ibuprofen (ADVIL;MOTRIN) 800 MG tablet     Sig: Take 1 tablet by mouth 2 times daily as needed for Pain     Dispense:  90 tablet     Refill:  11         Return in about 6 months (around 5/16/2023). EMR Dragon/transcription disclaimer:Significant part of this  encounter note is electronic transcription/translation of spoken language to printed text. The electronic translation of spoken language may be erroneous, or at times, nonsensical words or phrases may be inadvertently transcribed.  Although I have reviewed the note for such errors, some may still exist.

## 2022-12-05 ENCOUNTER — HOSPITAL ENCOUNTER (OUTPATIENT)
Dept: PAIN MANAGEMENT | Age: 47
Discharge: HOME | End: 2022-12-05
Payer: COMMERCIAL

## 2022-12-05 VITALS
TEMPERATURE: 97.3 F | SYSTOLIC BLOOD PRESSURE: 103 MMHG | OXYGEN SATURATION: 98 % | RESPIRATION RATE: 18 BRPM | DIASTOLIC BLOOD PRESSURE: 70 MMHG | HEART RATE: 80 BPM

## 2022-12-05 PROCEDURE — A4216 STERILE WATER/SALINE, 10 ML: HCPCS

## 2022-12-05 PROCEDURE — 64615 CHEMODENERV MUSC MIGRAINE: CPT | Performed by: PSYCHIATRY & NEUROLOGY

## 2022-12-05 PROCEDURE — 2580000003 HC RX 258

## 2022-12-05 PROCEDURE — 64615 CHEMODENERV MUSC MIGRAINE: CPT

## 2022-12-05 RX ORDER — SODIUM CHLORIDE 0.9 % (FLUSH) 0.9 %
5 SYRINGE (ML) INJECTION ONCE
Status: DISCONTINUED | OUTPATIENT
Start: 2022-12-05 | End: 2022-12-07 | Stop reason: HOSPADM

## 2022-12-05 NOTE — DISCHARGE INSTRUCTIONS
[facility]  Post Procedure Discharge Instructions        YOU HAVE HAD THE FOLLOWING PROCEDURE:                                  [] Occipital Nerve Blocks  [] CTS wrist injection(s)  [] Knee Injection(s)         [] Shoulder Injection(s)   [] Elbow Injection(s)     [x] Botox Injection  [] Cervical Trigger Point Injections    [] Thoracic Trigger Point Injections    [] Lumbar Trigger Point Injections  [] Piriformis Trigger Point Injections  [] SI Joint Injection(s)     [] Trochanteric Bursa Injection(s)       [] Ankle Injection(s)   [] Plantar Fasciitis   []  ______________  Injection(s) [x] Botox [x]  Migraines [] Spasticity    YOU HAVE RECEIVED THE FOLLOWING MEDICATIONS IN YOUR INJECTION(s)  [] Lidocaine [] Bupivacaine   [] DepoMedrol (steroid) [] Decadron (steroid)  []  Kenalog (steroid)   [] Toradol  [] Supartz [] Caleb Lorie    [x] Botox        PATIENT INFORMATION:   You may experience the following symptoms after your procedure. These symptoms are normal and should not cause concern: You may have an increase in your pain. This may last 24 - 48 hours after your procedure. You may have no change in the pain that you had prior to your injection(s). You may have weakness or numbness in your affected extremity. If this occurs, this may last until numbing the medication wears off. REPORT THE FOLLOWING SYMPTOMS TO YOUR DOCTOR:  Redness, swelling or drainage at the injection site(s)  Unusual pain that interferes with your normal activities of daily living. OTHER INSTRUCTIONS:    [x] I will apply ice to the injection site(s) for at least 24 hours after the procedure. I will rotate the ice on for 20 minutes and off for 20 minutes for at least 24 hours. [x] I will not apply heat for at least 48 hours and I will not take a hot bath or shower for at least 24 hours.      [x] I understand that if Lidocaine or Bupivacaine was used in my injection(s) that the injection site(s) will be numb for a few hours after the procedure    [x] I understand if a steroid was used it will take effect in 3 - 7 days. I understand that as the numbing medication wears off, the pain may return until the steroids start working. [x] I understand that today I will rest for the next 24 hours and drink plenty of water. [] For Botox for Migraines please do not wear anything constricting around the forehead for 7-10 days post injection. Examples headband, hats, or bandana    [] For Botox for Spasticity start therapy for the affected limb in two weeks. [] Additional instructions: ______________________________________________ ___________________________________________________________________    Sedation:  Was oral sedation given? [] Yes  [x] No    I understand that if I took an oral nerve calming medication I will not drive for  [] 24 hours after taking the medication.     [x] I have received a copy of my discharge instructions and understand the above instructions to the best of my knowledge    Patient Discharged:  [] Home  [] Hospital  [x] Other  ____________________________________________    Via:  [x] Ambulatory  [] Wheelchair   [] Stretcher [] EMS       Accompanied By:   [] Significant other  [] Family Member  [] Friend   [] Hospital Staff  []  Ambulance Staff  [] Other_______________________________________________    Plan:  [] Will return to the office in   [] 1 month   [] 3 months for:  [] Procedure Follow-up  [x] Office Visit   [x] Planned Procedure      Patient Signature: _____________________________________________________    Staff Signature: _______________________________________________________        If you have questions, problems or concerns you may call [phone] and follow the prompts

## 2022-12-05 NOTE — PROCEDURES
[address]    Patient Name: Becka Villalta    : 1975                    Age: 41 y.o. Sex: female    Date: 2022    Pre-op Diagnosis: Chronic Migraines    Post-op Diagnosis: Chronic Migraines    Procedure: Botox injections x 155 units (45 units wasted) for migraine    Performing Procedure:  Dr Morrell Alpers    Patient Vitals for the past 24 hrs:   BP Temp Temp src Pulse Resp SpO2   22 1047 103/70 97.3 °F (36.3 °C) Temporal 80 18 98 %       Previous Injections:  Patient had 7 migraines and 0 headaches over the past month. At least 100 units or greater of Botox was used. The patient had reduction in migraines and was able to increase their activity level post treatment with Botox    Indications:    Becka Villalta has been treated for problems with chronic migraine headaches. The patient was taken through a conservative course of treatment without complete resolution of symptoms. Botox injection therapy was offered and after the risks and benefits of the procedure were explained to the patient, we had agreed to proceed. The patient was taken to the procedure room and placed in the seated position. The following muscles were identified using palpation and standard landmarks. These muscles were marked and prepped with alcohol. A total of 155 units were injected after careful aspiration and the following distribution bilaterally:  10 units in 2 sites, procerus 5 units in one site, frontalis 20 units in 4 sites, temporalis 40 unit in 8 sites, occipitals 30 units in 6 sites, cervical paraspinals 20 units in 4 sites, and trapezius 30 units in 6 sites. Discharge: The patient tolerated the procedure well. There were no complications during the procedure and the patient was discharged home with discharge instructions.     The patient has been instructed to contact the office should there be any complications or questions to arise between today and their next appointment.     Plan:  [] Will return to the office in   [] 1 month  [] 4 - 6 weeks  [] 8 weeks   [x] 12 weeks:  [x] Procedure Follow-up   [] Office Visit      Oscar Segura MD, 12/5/2022 at 11:15 AM

## 2022-12-05 NOTE — PROGRESS NOTES
Procedure:  Level of Consciousness: [x]Alert [x]Oriented []Disoriented []Lethargic  Anxiety Level: [x]Calm []Anxious []Depressed []Other  Skin: []Warm [x]Dry []Cool []Moist []Intact []Other  Cardiovascular: [x]Palpitations: [x]Never []Occasionally []Frequently  Chest Pain: [x]No []Yes  Respiratory:  [x]Unlabored []Labored []Cough ([] Productive []Unproductive)  HCG Required: [x]No []Yes   Results: []Negative []Positive  Knowledge Level:        [x]Patient/Other verbalized understanding of pre-procedure instructions. [x]Assessment of post-op care needs (transportation, responsible caregiver)        [x]Able to discuss health care problems and how to deal with it. Factors that Affect Teaching:        Language Barrier: [x]No []Yes - why:        Hearing Loss:        [x]No []Yes            Corrective Device:  []Yes []No        Vision Loss:           [x]No []Yes            Corrective Device:  []Yes []No        Memory Loss:       [x]No []Yes            []Short Term []Long Term  Motivational Level:  [x]Asks Questions                  []Extremely Anxious       [x]Seems Interested               []Seems Uninterested                  [x]Denies need for Education  Risk for Injury:  [x]Patient oriented to person, place and time  []History of frequent falls/loss of balance  Nutritional:  []Change in appetite   []Weight Gain   []Weight Loss  Functional:  []Requires assistance with ADL's      Botox Assessment  [] Patient  has had a reduction of at least 100 hours (5 Days) in Migraines since receiving Botox  []  []  []  Factors that Affect Teaching:  Assessment of post-op care needs (transportation, responsible caregiver)        []Able to discuss health care problems and how to deal with it. Factors that Affect Teaching: Patient states that he/she has ___0___ headaches out of 30 days each month.   Patient states that he/she has __7____ migraines out of 30 days each month.monthly

## 2023-02-16 ENCOUNTER — TELEPHONE (OUTPATIENT)
Dept: NEUROSURGERY | Age: 48
End: 2023-02-16

## 2023-02-16 NOTE — TELEPHONE ENCOUNTER
[facility] New Patient Questionnaire    Diagnosis/Reason for Referral?    Radiculopathy, lumbar region  M54.2 (ICD-10-CM) - Neck pain    2. Who is completing questionnaire? Patient  Caregiver Family      3. Has the patient had any previous spinal/brain surgeries? NO      A. If yes, what is the name of the facility in which the surgery was performed? B. Procedure/Surgery performed? C. Who was the surgeon? D. When was the surgery? MM/YY       E. Did the patient improve after the surgery? 4. Is this a second opinion? If yes, Dr. Curtis Wooten would like to review patient first before making the appointment. NO    5. Have MRI Images been obtain within the last year? Yes  No      XR  CT     If yes, where was the imaging performed? [facility]   If yes, what part of the body? Lumbar  Cervical  Thoracic  Brain     If yes, when was it obtained? LAST WEEK     Note: if the scan was performed at a facility other than [address] [place] [address], the disc will need to be brought to the appointment or we need to reach out to obtain the disc. A. Was the patient instructed to provide the disc? Yes   No      8. Has the patient had a NCV/EMG within the last year? Yes  No     If yes, where was it performed and date? MM/YY  Location:      9. Has the patient been to Physical Therapy? Yes  No     If yes, what location, how long attended, and last visit? Location:        Therapy Lasted:    Date of Last Visit:      10. Has the patient been to Pain Management? Yes  No     If yes, what location and last visit     Location:   Last Visit:   Is it helping?

## 2023-02-22 ENCOUNTER — TELEPHONE (OUTPATIENT)
Dept: NEUROLOGY | Age: 48
End: 2023-02-22

## 2023-02-22 DIAGNOSIS — G43.719 INTRACTABLE CHRONIC MIGRAINE WITHOUT AURA AND WITHOUT STATUS MIGRAINOSUS: Primary | ICD-10-CM

## 2023-02-22 NOTE — TELEPHONE ENCOUNTER
Patient states that she has been having a headache for 9 days now. I instructed patient that she could go to the emergency room due to us not being able to give her anything in office, as I was instructing the patient of this she started talking over me and before I stopped talking she had stated \"who are you to talk to a patient like this\", I said Lurene Cushing my name is dulce and Im sorry I was giving you instructions and you interrupted me and patient then said well I'm not going to the emergency room. I said I would be glad to send Dr. Riaz Carpenter a message to see if he has any instructions for you and give you a call back. Patient said okay then hung up.        Please advise

## 2023-02-23 DIAGNOSIS — G43.719 INTRACTABLE CHRONIC MIGRAINE WITHOUT AURA AND WITHOUT STATUS MIGRAINOSUS: Primary | ICD-10-CM

## 2023-02-23 RX ORDER — PROMETHAZINE HYDROCHLORIDE 25 MG/1
25 TABLET ORAL EVERY EVENING
Qty: 30 TABLET | Refills: 5 | Status: SHIPPED | OUTPATIENT
Start: 2023-02-23 | End: 2023-03-25

## 2023-02-23 RX ORDER — DIPHENHYDRAMINE HCL 25 MG
25 CAPSULE ORAL NIGHTLY PRN
Qty: 30 CAPSULE | Refills: 5 | Status: SHIPPED | OUTPATIENT
Start: 2023-02-23 | End: 2023-03-25

## 2023-02-23 RX ORDER — HYDROMORPHONE HYDROCHLORIDE 4 MG/1
4 TABLET ORAL EVERY 6 HOURS PRN
Qty: 15 TABLET | Refills: 0 | Status: SHIPPED | OUTPATIENT
Start: 2023-02-23 | End: 2023-03-02

## 2023-02-23 RX ORDER — CHLORPROMAZINE HYDROCHLORIDE 25 MG/1
25 TABLET, FILM COATED ORAL EVERY EVENING
Qty: 30 TABLET | Refills: 5 | Status: SHIPPED | OUTPATIENT
Start: 2023-02-23

## 2023-02-23 NOTE — TELEPHONE ENCOUNTER
Sent in a cocktail of Benadryl, Thorazine, Phenergan and Dilaudid to be taken together and go to sleep.   That does not work may need to go to ER

## 2023-02-23 NOTE — TELEPHONE ENCOUNTER
Called patient and let her know. Patient agrees to get medication and go to sleep. Patent voiced that ER is not an option for her due to her  being out of town and she has a special needs child. Patient appreciated us getting back with her.  I voiced to patient if she needs anything else to call us back

## 2023-02-27 ENCOUNTER — HOSPITAL ENCOUNTER (OUTPATIENT)
Dept: PAIN MANAGEMENT | Age: 48
Discharge: HOME | End: 2023-02-27
Payer: COMMERCIAL

## 2023-02-27 ENCOUNTER — TELEPHONE (OUTPATIENT)
Dept: NEUROSURGERY | Age: 48
End: 2023-02-27

## 2023-02-27 VITALS
OXYGEN SATURATION: 100 % | TEMPERATURE: 97.9 F | RESPIRATION RATE: 18 BRPM | SYSTOLIC BLOOD PRESSURE: 109 MMHG | DIASTOLIC BLOOD PRESSURE: 92 MMHG | HEART RATE: 77 BPM

## 2023-02-27 PROCEDURE — 64615 CHEMODENERV MUSC MIGRAINE: CPT

## 2023-02-27 PROCEDURE — A4216 STERILE WATER/SALINE, 10 ML: HCPCS

## 2023-02-27 PROCEDURE — 2580000003 HC RX 258

## 2023-02-27 PROCEDURE — 64615 CHEMODENERV MUSC MIGRAINE: CPT | Performed by: PSYCHIATRY & NEUROLOGY

## 2023-02-27 RX ORDER — SODIUM CHLORIDE 0.9 % (FLUSH) 0.9 %
5 SYRINGE (ML) INJECTION ONCE
Status: DISCONTINUED | OUTPATIENT
Start: 2023-02-27 | End: 2023-03-01 | Stop reason: HOSPADM

## 2023-02-27 NOTE — PROCEDURES
[facility]    Patient Name: Milagro Barry    : 1975                    Age: 47 y.o.    Sex: female    Date: 2023    Pre-op Diagnosis: Chronic Migraines    Post-op Diagnosis: Chronic Migraines    Procedure: Botox injections x 155 units (45 units wasted) for migraine    Performing Procedure:  Dr Olegario Rodas    No data found.    Previous Injections:  Patient had 18 migraines and 0 headaches over the past month.     At least 100 units or greater of Botox was used. The patient had reduction in migraines and was able to increase their activity level post treatment with Botox    Indications:    Milagro Barry has been treated for problems with chronic migraine headaches. The patient was taken through a conservative course of treatment without complete resolution of symptoms. Botox injection therapy was offered and after the risks and benefits of the procedure were explained to the patient, we had agreed to proceed.    The patient was taken to the procedure room and placed in the seated position. The following muscles were identified using palpation and standard landmarks. These muscles were marked and prepped with alcohol. A total of 155 units were injected after careful aspiration and the following distribution bilaterally:  10 units in 2 sites, procerus 5 units in one site, frontalis 20 units in 4 sites, temporalis 40 unit in 8 sites, occipitals 30 units in 6 sites, cervical paraspinals 20 units in 4 sites, and trapezius 30 units in 6 sites.    Discharge:  The patient tolerated the procedure well. There were no complications during the procedure and the patient was discharged home with discharge instructions.    The patient has been instructed to contact the office should there be any complications or questions to arise between today and their next appointment.    Plan:  [] Will return to the office in   [] 1 month  [] 4 - 6  weeks  [] 8 weeks   [x] 12 weeks:  [x] Procedure Follow-up   [] Office Visit      Ashley Mott MD, 2/27/2023 at 9:33 AM

## 2023-02-27 NOTE — DISCHARGE INSTRUCTIONS
[facility] Physical [facility] Pain Medicine  Post Procedure Discharge Instructions        YOU HAVE HAD THE FOLLOWING PROCEDURE:                                  [] Occipital Nerve Blocks  [] CTS wrist injection(s)  [] Knee Injection(s)         [] Shoulder Injection(s)   [] Elbow Injection(s)     [] Botox Injection  [] Cervical Trigger Point Injections    [] Thoracic Trigger Point Injections    [] Lumbar Trigger Point Injections  [] Piriformis Trigger Point Injections  [] SI Joint Injection(s)     [] Trochanteric Bursa Injection(s)       [] Ankle Injection(s)   [] Plantar Fasciitis   []  ______________  Injection(s) [x] Botox [x]  Migraines [] Spasticity    YOU HAVE RECEIVED THE FOLLOWING MEDICATIONS IN YOUR INJECTION(s)  [] Lidocaine [] Bupivacaine   [] DepoMedrol (steroid) [] Decadron (steroid)  []  Kenalog (steroid)   [] Toradol  [] Supartz [] Lorena November    [x] Botox        PATIENT INFORMATION:   You may experience the following symptoms after your procedure. These symptoms are normal and should not cause concern: You may have an increase in your pain. This may last 24 - 48 hours after your procedure. You may have no change in the pain that you had prior to your injection(s). You may have weakness or numbness in your affected extremity. If this occurs, this may last until numbing the medication wears off. REPORT THE FOLLOWING SYMPTOMS TO YOUR DOCTOR:  Redness, swelling or drainage at the injection site(s)  Unusual pain that interferes with your normal activities of daily living. OTHER INSTRUCTIONS:    [] I will apply ice to the injection site(s) for at least 24 hours after the procedure. I will rotate the ice on for 20 minutes and off for 20 minutes for at least 24 hours. [] I will not apply heat for at least 48 hours and I will not take a hot bath or shower for at least 24 hours.      [] I understand that if Lidocaine or Bupivacaine was used in my injection(s) that the injection site(s) will be numb for a few hours after the procedure    [] I understand if a steroid was used it will take effect in 3 - 7 days. I understand that as the numbing medication wears off, the pain may return until the steroids start working. [] I understand that today I will rest for the next 24 hours and drink plenty of water. [x] For Botox for Migraines please do not wear anything constricting around the forehead for 7-10 days post injection. Examples headband, hats, or bandana    [] For Botox for Spasticity start therapy for the affected limb in two weeks. [] Additional instructions: ______________________________________________ ___________________________________________________________________    Sedation:  Was oral sedation given? [] Yes  [x] No    I understand that if I took an oral nerve calming medication I will not drive for  [] 24 hours after taking the medication.     [x] I have received a copy of my discharge instructions and understand the above instructions to the best of my knowledge    Patient Discharged:  [x] Home  [] Hospital  [] Other  ____________________________________________    Via:  [x] Ambulatory  [] Wheelchair   [] Stretcher [] EMS       Accompanied By:   [] Significant other  [] Family Member  [] Friend   [] Hospital Staff  []  Ambulance Staff  [] Other_______________________________________________    Plan:  [] Will return to the office in   [] 1 month   [x] 3 months for:  [] Procedure Follow-up  [] Office Visit   [x] Planned Procedure      Patient Signature: _____________________________________________________    Staff Signature: _______________________________________________________        If you have questions, problems or concerns you may call [phone] and follow the prompts    Dr. Clara Meredith

## 2023-03-15 ENCOUNTER — APPOINTMENT (OUTPATIENT)
Dept: URBAN - METROPOLITAN AREA CLINIC 265 | Age: 48
Setting detail: DERMATOLOGY
End: 2023-03-15

## 2023-03-15 VITALS — HEIGHT: 71 IN | RESPIRATION RATE: 18 BRPM | WEIGHT: 210 LBS

## 2023-03-15 DIAGNOSIS — L40.8 OTHER PSORIASIS: ICD-10-CM

## 2023-03-15 PROCEDURE — OTHER SEPARATE AND IDENTIFIABLE DOCUMENTATION: OTHER

## 2023-03-15 PROCEDURE — 99202 OFFICE O/P NEW SF 15 MIN: CPT | Mod: 25

## 2023-03-15 PROCEDURE — 11900 INJECT SKIN LESIONS </W 7: CPT

## 2023-03-15 PROCEDURE — OTHER COUNSELING: OTHER

## 2023-03-15 PROCEDURE — OTHER MIPS QUALITY: OTHER

## 2023-03-15 PROCEDURE — OTHER INTRALESIONAL KENALOG: OTHER

## 2023-03-15 ASSESSMENT — LOCATION DETAILED DESCRIPTION DERM
LOCATION DETAILED: LEFT DISTAL RADIAL THUMB
LOCATION DETAILED: LEFT DISTAL DORSAL RING FINGER
LOCATION DETAILED: RIGHT DISTAL DORSAL INDEX FINGER
LOCATION DETAILED: PERIUNGUAL SKIN RIGHT THUMB
LOCATION DETAILED: LEFT DISTAL DORSAL SMALL FINGER

## 2023-03-15 ASSESSMENT — LOCATION SIMPLE DESCRIPTION DERM
LOCATION SIMPLE: LEFT THUMB
LOCATION SIMPLE: RIGHT THUMB
LOCATION SIMPLE: LEFT RING FINGER
LOCATION SIMPLE: LEFT SMALL FINGER
LOCATION SIMPLE: RIGHT INDEX FINGER

## 2023-03-15 ASSESSMENT — LOCATION ZONE DERM: LOCATION ZONE: FINGER

## 2023-03-22 ENCOUNTER — OFFICE VISIT (OUTPATIENT)
Dept: NEUROSURGERY | Age: 48
End: 2023-03-22
Payer: COMMERCIAL

## 2023-03-22 VITALS
OXYGEN SATURATION: 97 % | HEART RATE: 82 BPM | DIASTOLIC BLOOD PRESSURE: 86 MMHG | RESPIRATION RATE: 18 BRPM | WEIGHT: 212 LBS | HEIGHT: 71 IN | SYSTOLIC BLOOD PRESSURE: 108 MMHG | BODY MASS INDEX: 29.68 KG/M2

## 2023-03-22 DIAGNOSIS — M25.512 CHRONIC PAIN OF BOTH SHOULDERS: ICD-10-CM

## 2023-03-22 DIAGNOSIS — M50.30 DDD (DEGENERATIVE DISC DISEASE), CERVICAL: ICD-10-CM

## 2023-03-22 DIAGNOSIS — M50.20 CERVICAL DISC HERNIATION: Primary | ICD-10-CM

## 2023-03-22 DIAGNOSIS — M51.26 LUMBAR DISC HERNIATION: ICD-10-CM

## 2023-03-22 DIAGNOSIS — M54.2 NECK PAIN: ICD-10-CM

## 2023-03-22 DIAGNOSIS — M54.41 CHRONIC MIDLINE LOW BACK PAIN WITH RIGHT-SIDED SCIATICA: ICD-10-CM

## 2023-03-22 DIAGNOSIS — M51.36 DDD (DEGENERATIVE DISC DISEASE), LUMBAR: ICD-10-CM

## 2023-03-22 DIAGNOSIS — G89.29 CHRONIC MIDLINE LOW BACK PAIN WITH RIGHT-SIDED SCIATICA: ICD-10-CM

## 2023-03-22 DIAGNOSIS — M25.511 CHRONIC PAIN OF BOTH SHOULDERS: ICD-10-CM

## 2023-03-22 DIAGNOSIS — G89.29 CHRONIC PAIN OF BOTH SHOULDERS: ICD-10-CM

## 2023-03-22 PROCEDURE — 99204 OFFICE O/P NEW MOD 45 MIN: CPT | Performed by: NURSE PRACTITIONER

## 2023-03-22 RX ORDER — MELOXICAM 15 MG/1
15 TABLET ORAL DAILY PRN
Qty: 30 TABLET | Refills: 1 | Status: SHIPPED | OUTPATIENT
Start: 2023-03-22 | End: 2023-04-21

## 2023-03-22 RX ORDER — METHOCARBAMOL 750 MG/1
750 TABLET, FILM COATED ORAL 3 TIMES DAILY PRN
Qty: 90 TABLET | Refills: 1 | Status: SHIPPED | OUTPATIENT
Start: 2023-03-22 | End: 2023-04-21

## 2023-03-22 ASSESSMENT — ENCOUNTER SYMPTOMS
GASTROINTESTINAL NEGATIVE: 1
EYES NEGATIVE: 1
BACK PAIN: 1
RESPIRATORY NEGATIVE: 1
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
## 2023-03-27 ENCOUNTER — TELEPHONE (OUTPATIENT)
Dept: NEUROSURGERY | Age: 48
End: 2023-03-27

## 2023-03-27 NOTE — TELEPHONE ENCOUNTER
Spoke with Maureen to ask if she had an idea on where she would like to go for massage therapy, and Maureen stated that she will have to hold off for now, due to her insurance won't pay for her to go see a massage therapist, unless it the massage therapist treats medical . Closing referral .

## 2023-05-04 ENCOUNTER — APPOINTMENT (OUTPATIENT)
Dept: URBAN - METROPOLITAN AREA CLINIC 265 | Age: 48
Setting detail: DERMATOLOGY
End: 2023-05-04

## 2023-05-04 VITALS — WEIGHT: 210 LBS | RESPIRATION RATE: 18 BRPM | HEIGHT: 71 IN

## 2023-05-04 DIAGNOSIS — L30.9 DERMATITIS, UNSPECIFIED: ICD-10-CM

## 2023-05-04 DIAGNOSIS — L40.8 OTHER PSORIASIS: ICD-10-CM

## 2023-05-04 PROCEDURE — 99214 OFFICE O/P EST MOD 30 MIN: CPT

## 2023-05-04 PROCEDURE — OTHER PRESCRIPTION: OTHER

## 2023-05-04 PROCEDURE — OTHER COUNSELING: OTHER

## 2023-05-04 PROCEDURE — OTHER MIPS QUALITY: OTHER

## 2023-05-04 PROCEDURE — OTHER PRESCRIPTION MEDICATION MANAGEMENT: OTHER

## 2023-05-04 PROCEDURE — OTHER ADDITIONAL NOTES: OTHER

## 2023-05-04 RX ORDER — CALCIPOTRIENE 0.05 MG/G
CREAM TOPICAL
Qty: 60 | Refills: 2 | Status: ERX | COMMUNITY
Start: 2023-05-04

## 2023-05-04 ASSESSMENT — LOCATION DETAILED DESCRIPTION DERM
LOCATION DETAILED: LEFT SMALL FINGERNAIL
LOCATION DETAILED: RIGHT THUMBNAIL
LOCATION DETAILED: LEFT RING FINGERNAIL
LOCATION DETAILED: RIGHT INDEX FINGERNAIL
LOCATION DETAILED: RIGHT MIDDLE FINGERNAIL
LOCATION DETAILED: LEFT DISTAL ULNAR THUMB
LOCATION DETAILED: RIGHT DORSAL GREAT TOE

## 2023-05-04 ASSESSMENT — LOCATION SIMPLE DESCRIPTION DERM
LOCATION SIMPLE: LEFT THUMB
LOCATION SIMPLE: LEFT RING FINGERNAIL
LOCATION SIMPLE: RIGHT GREAT TOE
LOCATION SIMPLE: LEFT SMALL FINGERNAIL
LOCATION SIMPLE: RIGHT THUMBNAIL
LOCATION SIMPLE: RIGHT MIDDLE FINGERNAIL
LOCATION SIMPLE: RIGHT INDEX FINGERNAIL

## 2023-05-04 ASSESSMENT — LOCATION ZONE DERM
LOCATION ZONE: FINGER
LOCATION ZONE: FINGERNAIL
LOCATION ZONE: TOE

## 2023-05-04 NOTE — PROCEDURE: PRESCRIPTION MEDICATION MANAGEMENT
Plan: Return to clinic in 3 months.\\nworse, not at treatment goal
Render In Strict Bullet Format?: No
Initiate Treatment: calcipotriene 0.005 % topical cream \\nQuantity: 60.0 g  Days Supply: 30\\nSig: Massage into affected area of nails twice daily for 5 minutes
Detail Level: Simple

## 2023-05-04 NOTE — PROCEDURE: ADDITIONAL NOTES
Render Risk Assessment In Note?: no
Additional Notes: Patient has history of red thick flaky rash on right elbow and both legs, some flaking in the scalp. Treated with clobetasol. None present today. Also some new joint pain in her left fourth finger. \\nI advised that history in setting of nail dystrophy, her diagnosis should very likely be psoriasis. Instructed her to RTC when rash is flaring. I would like to start her on a biologic if her clinical appearance confirms psoriasis diagnosis.
Detail Level: Zone

## 2023-05-18 ENCOUNTER — TELEPHONE (OUTPATIENT)
Dept: PAIN MANAGEMENT | Age: 48
End: 2023-05-18

## 2023-05-24 ENCOUNTER — TELEPHONE (OUTPATIENT)
Dept: PAIN MANAGEMENT | Age: 48
End: 2023-05-24

## 2023-06-05 ENCOUNTER — HOSPITAL ENCOUNTER (OUTPATIENT)
Dept: PAIN MANAGEMENT | Age: 48
Discharge: HOME | End: 2023-06-05
Payer: COMMERCIAL

## 2023-06-05 VITALS
OXYGEN SATURATION: 96 % | SYSTOLIC BLOOD PRESSURE: 121 MMHG | DIASTOLIC BLOOD PRESSURE: 78 MMHG | HEART RATE: 72 BPM | TEMPERATURE: 96.8 F | RESPIRATION RATE: 18 BRPM

## 2023-06-05 PROCEDURE — A4216 STERILE WATER/SALINE, 10 ML: HCPCS

## 2023-06-05 PROCEDURE — 64615 CHEMODENERV MUSC MIGRAINE: CPT

## 2023-06-05 PROCEDURE — 2580000003 HC RX 258

## 2023-06-05 PROCEDURE — 64615 CHEMODENERV MUSC MIGRAINE: CPT | Performed by: PSYCHIATRY & NEUROLOGY

## 2023-06-05 RX ORDER — SODIUM CHLORIDE 0.9 % (FLUSH) 0.9 %
5 SYRINGE (ML) INJECTION ONCE
Status: DISCONTINUED | OUTPATIENT
Start: 2023-06-05 | End: 2023-06-05

## 2023-06-05 RX ORDER — SODIUM CHLORIDE 0.9 % (FLUSH) 0.9 %
5 SYRINGE (ML) INJECTION ONCE
Status: DISCONTINUED | OUTPATIENT
Start: 2023-06-05 | End: 2023-06-07 | Stop reason: HOSPADM

## 2023-06-05 NOTE — DISCHARGE INSTRUCTIONS
will be numb for a few hours after the procedure    [] I understand if a steroid was used it will take effect in 3 - 7 days. I understand that as the numbing medication wears off, the pain may return until the steroids start working. [] I understand that today I will rest for the next 24 hours and drink plenty of water. [x] For Botox for Migraines please do not wear anything constricting around the forehead for 7-10 days post injection. Examples headband, hats, or bandana    [] For Botox for Spasticity start therapy for the affected limb in two weeks. [] Additional instructions: ______________________________________________ ___________________________________________________________________    Sedation:  Was oral sedation given? [] Yes  [x] No    I understand that if I took an oral nerve calming medication I will not drive for  [] 24 hours after taking the medication.     [x] I have received a copy of my discharge instructions and understand the above instructions to the best of my knowledge    Patient Discharged:  [x] Home  [] Hospital  [] Other  ____________________________________________    Via:  [x] Ambulatory  [] Wheelchair   [] Stretcher [] EMS       Accompanied By:   [] Significant other  [] Family Member  [] Friend   [] Hospital Staff  []  Ambulance Staff  [x] Other_______________________________________________    Plan:  [] Will return to the office in   [] 1 month   [] 3 months for:  [] Procedure Follow-up  [x] Office Visit   [x] Planned Procedure      Patient Signature: _____________________________________________________    Staff Signature: _______________________________________________________        If you have questions, problems or concerns you may call [phone] and follow the prompts    LENORA rBagg, [facility]-BC

## 2023-06-05 NOTE — PROCEDURES
arise between today and their next appointment.     Plan:  [] Will return to the office in   [] 1 month  [] 4 - 6 weeks  [] 8 weeks   [x] 12 weeks:  [x] Procedure Follow-up   [] Office Visit      Philip Tan MD, 6/5/2023 at 9:46 AM

## 2023-06-05 NOTE — PROGRESS NOTES
[address] [facility]   [address]  [phone]    Procedure:  Level of Consciousness: []Alert []Oriented []Disoriented []Lethargic  Anxiety Level: []Calm []Anxious []Depressed []Other  Skin: []Warm []Dry []Cool []Moist []Intact []Other  Cardiovascular: []Palpitations: []Never []Occasionally []Frequently  Chest Pain: []No []Yes  Respiratory:  []Unlabored []Labored []Cough ([] Productive []Unproductive)  HCG Required: []No []Yes   Results: []Negative []Positive  Knowledge Level:        []Patient/Other verbalized understanding of pre-procedure instructions. []Assessment of post-op care needs (transportation, responsible caregiver)        []Able to discuss health care problems and how to deal with it. Factors that Affect Teaching:        Language Barrier: []No []Yes - why:        Hearing Loss:        []No []Yes            Corrective Device:  []Yes []No        Vision Loss:           []No []Yes            Corrective Device:  []Yes []No        Memory Loss:       []No []Yes            []Short Term []Long Term  Motivational Level:  []Asks Questions                  []Extremely Anxious       []Seems Interested               []Seems Uninterested                  []Denies need for Education  Risk for Injury:  []Patient oriented to person, place and time  []History of frequent falls/loss of balance  Nutritional:  []Change in appetite   []Weight Gain   []Weight Loss  Functional:  []Requires assistance with ADL's      Migraine  Follow up Assessment:  Patient experiences 19 headaches per month  Patient states that he/she has 12 headaches out of 30 days each month. Patient states that he/she has 7 migraines out of 30 days each month.   Patient has experienced a  reduction in Migraine headaches less than 15 days per month [x]Yes []No  Patient has experienced a reduction in Migraine hours [x]Yes []No

## 2023-06-26 ENCOUNTER — TELEPHONE (OUTPATIENT)
Dept: NEUROLOGY | Age: 48
End: 2023-06-26

## 2023-06-27 ENCOUNTER — OFFICE VISIT (OUTPATIENT)
Dept: NEUROLOGY | Age: 48
End: 2023-06-27
Payer: COMMERCIAL

## 2023-06-27 VITALS
DIASTOLIC BLOOD PRESSURE: 82 MMHG | HEIGHT: 71 IN | BODY MASS INDEX: 29.68 KG/M2 | HEART RATE: 74 BPM | OXYGEN SATURATION: 97 % | WEIGHT: 212 LBS | SYSTOLIC BLOOD PRESSURE: 115 MMHG

## 2023-06-27 DIAGNOSIS — G43.909 MIGRAINE WITHOUT STATUS MIGRAINOSUS, NOT INTRACTABLE, UNSPECIFIED MIGRAINE TYPE: ICD-10-CM

## 2023-06-27 DIAGNOSIS — R11.0 NAUSEA: ICD-10-CM

## 2023-06-27 DIAGNOSIS — H53.149 PHOTOPHOBIA: ICD-10-CM

## 2023-06-27 DIAGNOSIS — G43.719 INTRACTABLE CHRONIC MIGRAINE WITHOUT AURA AND WITHOUT STATUS MIGRAINOSUS: Primary | ICD-10-CM

## 2023-06-27 DIAGNOSIS — M54.2 PAIN, NECK: ICD-10-CM

## 2023-06-27 PROCEDURE — 99214 OFFICE O/P EST MOD 30 MIN: CPT | Performed by: PSYCHIATRY & NEUROLOGY

## 2023-06-27 RX ORDER — ATOGEPANT 60 MG/1
60 TABLET ORAL DAILY
Qty: 30 TABLET | Refills: 11 | Status: SHIPPED | OUTPATIENT
Start: 2023-06-27

## 2023-07-13 NOTE — PROGRESS NOTES
Procedure:  Level of Consciousness: [x]Alert [x]Oriented []Disoriented []Lethargic  Anxiety Level: [x]Calm []Anxious []Depressed []Other  Skin: [x]Warm [x]Dry []Cool []Moist []Intact []Other  Cardiovascular: []Palpitations: []Never []Occasionally []Frequently  Chest Pain: [x]No []Yes  Respiratory:  [x]Unlabored []Labored []Cough ([] Productive []Unproductive)  HCG Required: []No []Yes   Results: []Negative []Positive  Knowledge Level:        [x]Patient/Other verbalized understanding of pre-procedure instructions. []Assessment of post-op care needs (transportation, responsible caregiver)        []Able to discuss health care problems and how to deal with it.   Factors that Affect Teaching:        Language Barrier: []No []Yes - why:        Hearing Loss:        []No []Yes            Corrective Device:  []Yes []No        Vision Loss:           []No []Yes            Corrective Device:  []Yes []No        Memory Loss:       []No []Yes            []Short Term []Long Term  Motivational Level:  [x]Asks Questions                  []Extremely Anxious       []Seems Interested               []Seems Uninterested                  []Denies need for Education  Risk for Injury:  [x]Patient oriented to person, place and time  []History of frequent falls/loss of balance  Nutritional:  []Change in appetite   []Weight Gain   []Weight Loss  Functional:  []Requires assistance with ADL's
None

## 2023-07-25 ENCOUNTER — TELEPHONE (OUTPATIENT)
Dept: PAIN MANAGEMENT | Age: 48
End: 2023-07-25

## 2023-07-26 RX ORDER — ONABOTULINUMTOXINA 200 [USP'U]/1
INJECTION, POWDER, LYOPHILIZED, FOR SOLUTION INTRADERMAL; INTRAMUSCULAR
Qty: 1 EACH | Refills: 3 | OUTPATIENT
Start: 2023-07-26

## 2023-07-28 ENCOUNTER — OFFICE VISIT (OUTPATIENT)
Dept: NEUROSURGERY | Age: 48
End: 2023-07-28
Payer: COMMERCIAL

## 2023-07-28 VITALS
SYSTOLIC BLOOD PRESSURE: 122 MMHG | WEIGHT: 212 LBS | DIASTOLIC BLOOD PRESSURE: 72 MMHG | HEART RATE: 80 BPM | HEIGHT: 71 IN | BODY MASS INDEX: 29.68 KG/M2 | RESPIRATION RATE: 18 BRPM

## 2023-07-28 DIAGNOSIS — M25.512 CHRONIC PAIN OF BOTH SHOULDERS: ICD-10-CM

## 2023-07-28 DIAGNOSIS — M25.511 CHRONIC PAIN OF BOTH SHOULDERS: ICD-10-CM

## 2023-07-28 DIAGNOSIS — G89.29 CHRONIC PAIN OF BOTH SHOULDERS: ICD-10-CM

## 2023-07-28 DIAGNOSIS — M50.20 CERVICAL DISC HERNIATION: Primary | ICD-10-CM

## 2023-07-28 DIAGNOSIS — M54.2 NECK PAIN: ICD-10-CM

## 2023-07-28 DIAGNOSIS — M50.30 DDD (DEGENERATIVE DISC DISEASE), CERVICAL: ICD-10-CM

## 2023-07-28 PROCEDURE — 99213 OFFICE O/P EST LOW 20 MIN: CPT | Performed by: NURSE PRACTITIONER

## 2023-07-28 RX ORDER — METHOCARBAMOL 750 MG/1
750 TABLET, FILM COATED ORAL 3 TIMES DAILY PRN
Qty: 90 TABLET | Refills: 2 | Status: SHIPPED | OUTPATIENT
Start: 2023-07-28 | End: 2023-10-26

## 2023-07-28 ASSESSMENT — ENCOUNTER SYMPTOMS
EYES NEGATIVE: 1
RESPIRATORY NEGATIVE: 1
GASTROINTESTINAL NEGATIVE: 1

## 2023-07-28 NOTE — PROGRESS NOTES
Review of Systems   Constitutional: Negative. HENT: Negative. Eyes: Negative. Respiratory: Negative. Cardiovascular: Negative. Gastrointestinal: Negative. Genitourinary: Negative. Musculoskeletal:  Positive for joint pain, myalgias and neck pain. Skin: Negative. Neurological:  Positive for headaches. Endo/Heme/Allergies: Negative. Psychiatric/Behavioral: Negative.
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
## 2023-08-03 ENCOUNTER — APPOINTMENT (OUTPATIENT)
Dept: URBAN - METROPOLITAN AREA CLINIC 298 | Age: 48
Setting detail: DERMATOLOGY
End: 2023-08-08

## 2023-08-03 VITALS — WEIGHT: 198 LBS | HEIGHT: 55 IN | RESPIRATION RATE: 18 BRPM

## 2023-08-03 DIAGNOSIS — L40.8 OTHER PSORIASIS: ICD-10-CM

## 2023-08-03 DIAGNOSIS — L40.59 OTHER PSORIATIC ARTHROPATHY: ICD-10-CM

## 2023-08-03 DIAGNOSIS — L40.0 PSORIASIS VULGARIS: ICD-10-CM

## 2023-08-03 DIAGNOSIS — Z79.899 OTHER LONG TERM (CURRENT) DRUG THERAPY: ICD-10-CM

## 2023-08-03 PROCEDURE — OTHER ADDITIONAL NOTES: OTHER

## 2023-08-03 PROCEDURE — OTHER COUNSELING: OTHER

## 2023-08-03 PROCEDURE — OTHER MEDICATION COUNSELING: OTHER

## 2023-08-03 PROCEDURE — OTHER MIPS QUALITY: OTHER

## 2023-08-03 PROCEDURE — OTHER ORDER TESTS: OTHER

## 2023-08-03 PROCEDURE — 99214 OFFICE O/P EST MOD 30 MIN: CPT

## 2023-08-03 PROCEDURE — OTHER PRESCRIPTION MEDICATION MANAGEMENT: OTHER

## 2023-08-03 ASSESSMENT — LOCATION SIMPLE DESCRIPTION DERM
LOCATION SIMPLE: LEFT HAND
LOCATION SIMPLE: LEFT RING FINGERNAIL
LOCATION SIMPLE: LEFT THUMB
LOCATION SIMPLE: RIGHT MIDDLE FINGERNAIL
LOCATION SIMPLE: LEFT HIP
LOCATION SIMPLE: RIGHT INDEX FINGERNAIL
LOCATION SIMPLE: POSTERIOR SCALP
LOCATION SIMPLE: RIGHT HIP
LOCATION SIMPLE: RIGHT GREAT TOE
LOCATION SIMPLE: LEFT FOOT
LOCATION SIMPLE: RIGHT THUMBNAIL
LOCATION SIMPLE: LEFT SMALL FINGERNAIL
LOCATION SIMPLE: RIGHT HAND
LOCATION SIMPLE: RIGHT FOOT

## 2023-08-03 ASSESSMENT — LOCATION ZONE DERM
LOCATION ZONE: FINGERNAIL
LOCATION ZONE: FINGER
LOCATION ZONE: SCALP
LOCATION ZONE: TOE
LOCATION ZONE: FOOT
LOCATION ZONE: HIP
LOCATION ZONE: HAND

## 2023-08-03 ASSESSMENT — LOCATION DETAILED DESCRIPTION DERM
LOCATION DETAILED: RIGHT DORSAL GREAT TOE
LOCATION DETAILED: LEFT FOOT
LOCATION DETAILED: RIGHT INDEX FINGERNAIL
LOCATION DETAILED: LEFT HAND
LOCATION DETAILED: LEFT HIP JOINT
LOCATION DETAILED: RIGHT FOOT
LOCATION DETAILED: RIGHT HIP JOINT
LOCATION DETAILED: RIGHT THUMBNAIL
LOCATION DETAILED: LEFT SMALL FINGERNAIL
LOCATION DETAILED: LEFT DISTAL ULNAR THUMB
LOCATION DETAILED: LEFT RING FINGERNAIL
LOCATION DETAILED: MID-OCCIPITAL SCALP
LOCATION DETAILED: RIGHT MIDDLE FINGERNAIL
LOCATION DETAILED: RIGHT HAND

## 2023-08-03 ASSESSMENT — PGA PSORIASIS: PGA PSORIASIS 2020: SEVERE

## 2023-08-03 NOTE — PROCEDURE: MEDICATION COUNSELING
patient Clindamycin Counseling: I counseled the patient regarding use of clindamycin as an antibiotic for prophylactic and/or therapeutic purposes. Clindamycin is active against numerous classes of bacteria, including skin bacteria. Side effects may include nausea, diarrhea, gastrointestinal upset, rash, hives, yeast infections, and in rare cases, colitis.

## 2023-08-03 NOTE — PROCEDURE: ADDITIONAL NOTES
Render Risk Assessment In Note?: no
Detail Level: Simple
Additional Notes: Joint pain is worsening. Concern for long-term erosion and degradation of joints.
Additional Notes: Plan: Patient has flaking in the scalp today. She has history of large psoriatic plaques on trunk and arm. These plaques are not evident today, but their history supports the diagnosis of psoriatic nails and psoriatic arthritis which are the target conditions requiring biologic therapy.

## 2023-08-03 NOTE — PROCEDURE: MEDICATION COUNSELING
Medical Week 1 Survey    Flowsheet Row Responses   [facility] patient discharged from? [place]   Does the patient have one of the following disease processes/diagnoses(primary or secondary)? Other   Week 1 attempt successful? No   Unsuccessful attempts Attempt 2          JOMAR JENSEN - Registered Nurse   Dupixent Pregnancy And Lactation Text: This medication likely crosses the placenta but the risk for the fetus is uncertain. This medication is excreted in breast milk.

## 2023-08-03 NOTE — PROCEDURE: PRESCRIPTION MEDICATION MANAGEMENT
Continue Regimen: calcipotriene 0.005 % topical cream, massage into affected area of nails twice daily for 5 minutes
Plan: Patient to have labs drawn today.\\n\\npatient has severe involvement of fingernails and toenails. they are painful and significantly negatively impact her daily life. They have been worsening significantly over the last several months. They are much worse than previous presentation.
Render In Strict Bullet Format?: No
Initiate Treatment: Cosentyx, will begin the PA process
Detail Level: Simple

## 2023-08-03 NOTE — PROCEDURE: ORDER TESTS
Billing Type: Third-Party Bill
Performing Laboratory: 0
Expected Date Of Service: 08/03/2023
Bill For Surgical Tray: no

## 2023-08-11 ENCOUNTER — RX ONLY (RX ONLY)
Age: 48
End: 2023-08-11

## 2023-08-11 RX ORDER — SECUKINUMAB 150 MG/ML
INJECTION SUBCUTANEOUS
Qty: 1 | Refills: 6 | Status: ERX | COMMUNITY
Start: 2023-08-11

## 2023-08-11 RX ORDER — SECUKINUMAB 150 MG/ML
INJECTION SUBCUTANEOUS
Qty: 5 | Refills: 0 | Status: ERX

## 2023-08-11 RX ORDER — SECUKINUMAB 150 MG/ML
INJECTION SUBCUTANEOUS
Qty: 1 | Refills: 6 | Status: ERX

## 2023-09-18 ENCOUNTER — HOSPITAL ENCOUNTER (OUTPATIENT)
Dept: PAIN MANAGEMENT | Age: 48
Discharge: HOME | End: 2023-09-18
Payer: COMMERCIAL

## 2023-09-18 VITALS
TEMPERATURE: 96.7 F | HEART RATE: 79 BPM | DIASTOLIC BLOOD PRESSURE: 56 MMHG | RESPIRATION RATE: 18 BRPM | SYSTOLIC BLOOD PRESSURE: 100 MMHG | OXYGEN SATURATION: 98 %

## 2023-09-18 PROCEDURE — A4216 STERILE WATER/SALINE, 10 ML: HCPCS

## 2023-09-18 PROCEDURE — 64615 CHEMODENERV MUSC MIGRAINE: CPT

## 2023-09-18 PROCEDURE — 2580000003 HC RX 258

## 2023-09-18 PROCEDURE — 64615 CHEMODENERV MUSC MIGRAINE: CPT | Performed by: PSYCHIATRY & NEUROLOGY

## 2023-09-18 RX ORDER — SODIUM CHLORIDE 9 MG/ML
5 INJECTION INTRAVENOUS ONCE
Status: DISCONTINUED | OUTPATIENT
Start: 2023-09-18 | End: 2023-09-20 | Stop reason: HOSPADM

## 2023-09-18 NOTE — PROGRESS NOTES
Procedure:  Level of Consciousness: [x]Alert [x]Oriented []Disoriented []Lethargic  Anxiety Level: [x]Calm []Anxious []Depressed []Other  Skin: []Warm [x]Dry []Cool []Moist []Intact []Other  Cardiovascular: [x]Palpitations: [x]Never []Occasionally []Frequently  Chest Pain: [x]No []Yes  Respiratory:  [x]Unlabored []Labored []Cough ([] Productive []Unproductive)  HCG Required: [x]No []Yes   Results: []Negative []Positive  Knowledge Level:        [x]Patient/Other verbalized understanding of pre-procedure instructions. [x]Assessment of post-op care needs (transportation, responsible caregiver)        [x]Able to discuss health care problems and how to deal with it.   Factors that Affect Teaching:        Language Barrier: [x]No []Yes - why:        Hearing Loss:        [x]No []Yes            Corrective Device:  []Yes []No        Vision Loss:           [x]No []Yes            Corrective Device:  []Yes []No        Memory Loss:       [x]No []Yes            []Short Term []Long Term  Motivational Level:  [x]Asks Questions                  []Extremely Anxious       []Seems Interested               []Seems Uninterested                  []Denies need for Education  Risk for Injury:  [x]Patient oriented to person, place and time  []History of frequent falls/loss of balance  Nutritional:  []Change in appetite   []Weight Gain   []Weight Loss  Functional:  []Requires assistance with ADL's

## 2023-09-18 NOTE — DISCHARGE INSTRUCTIONS
[address] [facility]  Post Procedure Discharge Instructions        YOU HAVE HAD THE FOLLOWING PROCEDURE:                                  [] Occipital Nerve Blocks  [] CTS wrist injection(s)  [] Knee Injection(s)         [] Shoulder Injection(s)   [] Elbow Injection(s)     [x] Botox Injection  [] Cervical Trigger Point Injections    [] Thoracic Trigger Point Injections    [] Lumbar Trigger Point Injections  [] Piriformis Trigger Point Injections  [] SI Joint Injection(s)     [] Trochanteric Bursa Injection(s)       [] Ankle Injection(s)   [] Plantar Fasciitis   []  ______________  Injection(s) [x] Botox [x]  Migraines [] Spasticity    YOU HAVE RECEIVED THE FOLLOWING MEDICATIONS IN YOUR INJECTION(s)  [] Lidocaine [] Bupivacaine   [] DepoMedrol (steroid) [] Decadron (steroid)  []  Kenalog (steroid)   [] Toradol  [] Supartz [] [place] Bowling    [x] Botox        PATIENT INFORMATION:   You may experience the following symptoms after your procedure. These symptoms are normal and should not cause concern: You may have an increase in your pain. This may last 24 - 48 hours after your procedure. You may have no change in the pain that you had prior to your injection(s). You may have weakness or numbness in your affected extremity. If this occurs, this may last until numbing the medication wears off. REPORT THE FOLLOWING SYMPTOMS TO YOUR DOCTOR:  Redness, swelling or drainage at the injection site(s)  Unusual pain that interferes with your normal activities of daily living. OTHER INSTRUCTIONS:    [] I will apply ice to the injection site(s) for at least 24 hours after the procedure. I will rotate the ice on for 20 minutes and off for 20 minutes for at least 24 hours. [] I will not apply heat for at least 48 hours and I will not take a hot bath or shower for at least 24 hours.      [] I understand that if Lidocaine or Bupivacaine was used in my injection(s) that the injection site(s)

## 2023-09-22 ENCOUNTER — APPOINTMENT (OUTPATIENT)
Dept: URBAN - METROPOLITAN AREA CLINIC 298 | Age: 48
Setting detail: DERMATOLOGY
End: 2023-09-22

## 2023-09-22 DIAGNOSIS — L40.0 PSORIASIS VULGARIS: ICD-10-CM

## 2023-09-22 PROCEDURE — OTHER ADDITIONAL NOTES: OTHER

## 2023-09-22 PROCEDURE — OTHER BIOLOGIC INJECTION TRAINING: OTHER

## 2023-09-22 NOTE — PROCEDURE: BIOLOGIC INJECTION TRAINING
Cosentyx Training Text: We discussed Cosentyx injection. I demonstrated how to clean the skin and administer the medication.
Stelara Training Text: We discussed Stelara injection. I demonstrated how to clean the skin and administer the medication.
Ilumya Training Text: We discussed Ilumya injection. I demonstrated how to clean the skin and administer the medication.
Who Did You Train: The Patient
Enbrel Training Text: We discussed Enbrel injection. I demonstrated how to clean the skin and administer the medication.
Taltz Training Text: We discussed Taltz injection. I demonstrated how to clean the skin and administer the medication.
Adbry Training Text: We discussed Adbry injection. I demonstrated how to clean the skin and administer the medication.
Detail Level: Simple
Simponi Training Text: We discussed Simponi injection. I demonstrated how to clean the skin and administer the medication.
Siliq Training Text: We discussed Siliq injection. I demonstrated how to clean the skin and administer the medication.
Cimzia Training Text: We discussed Cimzia injection. I demonstrated how to clean the skin and administer the medication.
Dupixent Training Text: We discussed Dupixent injection. I demonstrated how to clean the skin and administer the medication.
Which Biologic Is The Patient Receiving Training For?: Cosentyx
Humira Training Text: We discussed Humira injection. I demonstrated how to clean the skin and administer the medication.
Tremfya Training Text: We discussed Tremfya injection. I demonstrated how to clean the skin and administer the medication.
Skyrizi Training Text: We discussed Skyrizi injection. I demonstrated how to clean the skin and administer the medication.
Xolair Training Text: We discussed Xolair injection. I demonstrated how to clean the skin and administer the medication.

## 2023-09-22 NOTE — PROCEDURE: ADDITIONAL NOTES
Additional Notes: We discussed Cosentyx injection. I demonstrated how to clean the skin and administer the medication.\\nWitnessed patient checking syringe and expiration date .\\n\\nLot: [id number]\\n\\nExpiration:Feb 2025\\n\\nPrepped the injection site with alcohol. I injected 150mg of Cosentyx into the patient's left abdomen.\\nPatient then repeated same process on right abdomen. Additional Notes: We discussed Cosentyx injection. I demonstrated how to clean the skin and administer the medication.\\nWitnessed patient checking syringe and expiration date .\\n\\nLot: [id number]\\n\\nExpiration:Feb 2025\\n\\nPrepped the injection site with alcohol. I injected 150mg of Cosentyx into the patient's left abdomen.\\nPatient then repeated same process on right abdomen.

## 2023-11-07 ENCOUNTER — APPOINTMENT (OUTPATIENT)
Dept: URBAN - METROPOLITAN AREA CLINIC 298 | Age: 48
Setting detail: DERMATOLOGY
End: 2023-11-10

## 2023-11-07 VITALS — WEIGHT: 198 LBS | HEIGHT: 55 IN | RESPIRATION RATE: 18 BRPM

## 2023-11-07 DIAGNOSIS — L40.0 PSORIASIS VULGARIS: ICD-10-CM

## 2023-11-07 DIAGNOSIS — L40.59 OTHER PSORIATIC ARTHROPATHY: ICD-10-CM

## 2023-11-07 DIAGNOSIS — Z79.899 OTHER LONG TERM (CURRENT) DRUG THERAPY: ICD-10-CM

## 2023-11-07 DIAGNOSIS — L40.8 OTHER PSORIASIS: ICD-10-CM

## 2023-11-07 PROCEDURE — OTHER MEDICATION COUNSELING: OTHER

## 2023-11-07 PROCEDURE — OTHER ADDITIONAL NOTES: OTHER

## 2023-11-07 PROCEDURE — OTHER COUNSELING: OTHER

## 2023-11-07 PROCEDURE — OTHER MIPS QUALITY: OTHER

## 2023-11-07 PROCEDURE — 99214 OFFICE O/P EST MOD 30 MIN: CPT

## 2023-11-07 PROCEDURE — OTHER PRESCRIPTION MEDICATION MANAGEMENT: OTHER

## 2023-11-07 ASSESSMENT — LOCATION SIMPLE DESCRIPTION DERM
LOCATION SIMPLE: RIGHT HIP
LOCATION SIMPLE: RIGHT THUMBNAIL
LOCATION SIMPLE: LEFT HIP
LOCATION SIMPLE: LEFT FOOT
LOCATION SIMPLE: RIGHT MIDDLE FINGERNAIL
LOCATION SIMPLE: LEFT RING FINGERNAIL
LOCATION SIMPLE: RIGHT GREAT TOE
LOCATION SIMPLE: RIGHT INDEX FINGERNAIL
LOCATION SIMPLE: POSTERIOR SCALP
LOCATION SIMPLE: LEFT HAND
LOCATION SIMPLE: RIGHT FOOT
LOCATION SIMPLE: RIGHT HAND
LOCATION SIMPLE: LEFT THUMB
LOCATION SIMPLE: LEFT SMALL FINGERNAIL

## 2023-11-07 ASSESSMENT — LOCATION DETAILED DESCRIPTION DERM
LOCATION DETAILED: RIGHT FOOT
LOCATION DETAILED: LEFT HIP JOINT
LOCATION DETAILED: MID-OCCIPITAL SCALP
LOCATION DETAILED: RIGHT DORSAL GREAT TOE
LOCATION DETAILED: RIGHT HAND
LOCATION DETAILED: RIGHT MIDDLE FINGERNAIL
LOCATION DETAILED: LEFT RING FINGERNAIL
LOCATION DETAILED: RIGHT THUMBNAIL
LOCATION DETAILED: LEFT HAND
LOCATION DETAILED: LEFT SMALL FINGERNAIL
LOCATION DETAILED: RIGHT HIP JOINT
LOCATION DETAILED: LEFT FOOT
LOCATION DETAILED: RIGHT INDEX FINGERNAIL
LOCATION DETAILED: LEFT DISTAL ULNAR THUMB

## 2023-11-07 ASSESSMENT — LOCATION ZONE DERM
LOCATION ZONE: TOE
LOCATION ZONE: FOOT
LOCATION ZONE: HIP
LOCATION ZONE: FINGER
LOCATION ZONE: FINGERNAIL
LOCATION ZONE: SCALP
LOCATION ZONE: HAND

## 2023-11-07 NOTE — PROCEDURE: MEDICATION COUNSELING
Elizabeth Sheridan is a 79 y.o. male patient. 1. Neutropenic fever ([facility]    2. [facility], [facility] status unspecified [facility]    3. COVID-19    4. Acute respiratory failure due to COVID-19 ([facility]    5. Septic shock ([facility]    6. Anemia, unspecified type    7. Hypomagnesemia    8. Hyperkalemia      Past Medical History:   Diagnosis Date    Can[facility] ([facility]     ESRD (end stage renal disease) on dialysis ([facility]     Mon-Wed-Fri     Blood pressure 128/73, pulse (!) 141, temperature 100 °F (37.8 °C), resp. rate 21, height 6' 2.02\" (1.88 m), weight 235 lb (106.6 kg), SpO2 100 %. Insert Arterial Line    Date/Time: 8/6/2022 3:44 AM  Performed by: Javy Mcgee MD  Authorized by: Javy Mcgee MD   Consent: Verbal consent obtained. Risks and benefits: risks, benefits and alternatives were discussed  Consent given by: patient  Patient understanding: patient states understanding of the procedure being performed  Patient consent: the patient's understanding of the procedure matches consent given  Procedure consent: procedure consent matches procedure scheduled  Relevant documents: relevant documents present and verified  Test results: test results available and properly labeled  Site marked: the operative site was marked  Imaging studies: imaging studies available  Patient identity confirmed: arm band and hospital-assigned identification number  Time out: Immediately prior to procedure a \"time out\" was called to verify the correct patient, procedure, equipment, support staff and site/side marked as required. Preparation: Patient was prepped and draped in the usual sterile fashion.   Indications: hemodynamic monitoring  Location: right radial    Sedation:  Patient sedated: yes  Sedatives: propofol    Enrike's test normal: yes  Needle gauge: 18  Seldinger technique: Seldinger technique used  Number of attempts: 2  Post-procedure: line sutured and dressing applied  Post-procedure CMS: normal  Patient tolerance: patient tolerated the procedure well with no immediate complications  Comments: EBL 2ml. Left radial A-line not correlating with cuff. 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 Therefore, left radial A-line removed and right radial A-line placed.         Jayce Tejada MD  8/6/2022 Carac Counseling:  I discussed with the patient the risks of Carac including but not limited to erythema, scaling, itching, weeping, crusting, and pain.

## 2023-11-07 NOTE — PROCEDURE: MEDICATION COUNSELING
Nephrology Wartpeel Counseling:  I discussed with the patient the risks of Wartpeel including but not limited to erythema, scaling, itching, weeping, crusting, and pain.

## 2023-11-07 NOTE — PROCEDURE: ADDITIONAL NOTES
Render Risk Assessment In Note?: no
Detail Level: Simple
Additional Notes: Patient reports joint pain has improved. No progression noted since Cosentyx initiation.

## 2023-11-07 NOTE — PROCEDURE: PRESCRIPTION MEDICATION MANAGEMENT
Continue Regimen: Cosentyx as directed
Plan: Patient feels that her nails are improving. She has less pain and thinks they are starting to look better. I advised that because this is a very difficult condition to treat, we can consider any halt in the progression a success. She is pleased at this time. \\n\\nSince beginning Cosentyx, patient is experiencing a persistent flare of HSV that is being treated with valacyclovir. She and her gyn have discussed management of this condition and patient states that she wishes to continue cosentyx at this time despite outbreak. We discussed possibly spacing out doses further as she gains more control.
Render In Strict Bullet Format?: No
Detail Level: Simple

## 2023-11-10 ENCOUNTER — APPOINTMENT (OUTPATIENT)
Dept: URBAN - METROPOLITAN AREA CLINIC 298 | Age: 48
Setting detail: DERMATOLOGY
End: 2023-11-20

## 2023-11-10 DIAGNOSIS — Z41.9 ENCOUNTER FOR PROCEDURE FOR PURPOSES OTHER THAN REMEDYING HEALTH STATE, UNSPECIFIED: ICD-10-CM

## 2023-11-10 PROCEDURE — OTHER ADDITIONAL NOTES: OTHER

## 2023-11-10 PROCEDURE — OTHER FILLERS: OTHER

## 2023-11-10 NOTE — PROCEDURE: ADDITIONAL NOTES
Render Risk Assessment In Note?: no
Additional Notes: Gave epicutis set to try .
Detail Level: Simple

## 2023-11-10 NOTE — PROCEDURE: FILLERS
[place] Filler  Volume In Cc: 0
Inventory Information: This plan will send filler information to inventory based on the fillers you select. Multiple fillers can be sent but you must ensure you select the appropriate fillers in the inventory tab.
Filler: Richard Marquis
Number Of Syringes (Required For Inventory): 1
Additional Area 2 Location: earlobes
Detail Level: Detailed
Consent: Written consent obtained. Risks include but not limited to bruising, beading, irregular texture, ulceration, infection, allergic reaction, scar formation, incomplete augmentation, temporary nature, procedural pain.
Include Cannula Information In Note?: No
Post-Care Instructions: Patient instructed to apply ice to reduce swelling.
Show Inventory Tab: Show
Anesthesia Type: 1% lidocaine with epinephrine
Filler: Restylane Contour
Anesthesia Volume In Cc: 0.5
Additional Anesthesia Volume In Cc: 6
Topical Anesthesia?: 23% lidocaine, 7% tetracaine
Map Statment: See Attach Map for Complete Details
Additional Area 1 Location: smile lines

## 2023-12-11 ENCOUNTER — HOSPITAL ENCOUNTER (OUTPATIENT)
Dept: PAIN MANAGEMENT | Age: 48
Discharge: HOME | End: 2023-12-11
Payer: COMMERCIAL

## 2023-12-11 VITALS
HEART RATE: 72 BPM | SYSTOLIC BLOOD PRESSURE: 137 MMHG | DIASTOLIC BLOOD PRESSURE: 80 MMHG | TEMPERATURE: 97.9 F | OXYGEN SATURATION: 100 % | RESPIRATION RATE: 18 BRPM

## 2023-12-11 PROCEDURE — 2580000003 HC RX 258

## 2023-12-11 PROCEDURE — A4216 STERILE WATER/SALINE, 10 ML: HCPCS

## 2023-12-11 PROCEDURE — 64615 CHEMODENERV MUSC MIGRAINE: CPT | Performed by: PSYCHIATRY & NEUROLOGY

## 2023-12-11 PROCEDURE — 64615 CHEMODENERV MUSC MIGRAINE: CPT

## 2023-12-11 RX ORDER — SODIUM CHLORIDE 9 MG/ML
5 INJECTION INTRAVENOUS ONCE
Status: DISCONTINUED | OUTPATIENT
Start: 2023-12-11 | End: 2023-12-13 | Stop reason: HOSPADM

## 2023-12-11 NOTE — PROGRESS NOTES
[facility]   Linnea fisher  [phone]    Procedure:  Level of Consciousness: [x]Alert [x]Oriented []Disoriented []Lethargic  Anxiety Level: [x]Calm []Anxious []Depressed []Other  Skin: [x]Warm [x]Dry []Cool []Moist []Intact []Other  Cardiovascular: [x]Palpitations: [x]Never []Occasionally []Frequently  Chest Pain: [x]No []Yes  Respiratory:  [x]Unlabored []Labored []Cough ([] Productive []Unproductive)  HCG Required: [x]No []Yes   Results: []Negative []Positive  Knowledge Level:        [x]Patient/Other verbalized understanding of pre-procedure instructions. [x]Assessment of post-op care needs (transportation, responsible caregiver)        [x]Able to discuss health care problems and how to deal with it. Factors that Affect Teaching:        Language Barrier: [x]No []Yes - why:        Hearing Loss:        [x]No []Yes            Corrective Device:  []Yes []No        Vision Loss:           [x]No []Yes            Corrective Device:  []Yes [x]No        Memory Loss:       [x]No []Yes            []Short Term []Long Term  Motivational Level:  [x]Asks Questions                  []Extremely Anxious       [x]Seems Interested               []Seems Uninterested                  [x]Denies need for Education  Risk for Injury:  [x]Patient oriented to person, place and time  []History of frequent falls/loss of balance  Nutritional:  []Change in appetite   []Weight Gain   []Weight Loss  Functional:  []Requires assistance with ADL's      Migraine  Follow up Assessment:  Patient experiences 7 headaches per month  Patient states that he/she has 7 headaches out of 30 days each month. Patient states that he/she has 13 migraines out of 30 days each month.   Patient has experienced a  reduction in Migraine headaches less than 15 days per month [x]Yes []No  Patient has experienced a reduction in Migraine hours [x]Yes []No

## 2023-12-11 NOTE — PROCEDURES
questions to arise between today and their next appointment.     Plan:  [] Will return to the office in   [] 1 month  [] 4 - 6 weeks  [] 8 weeks   [x] 12 weeks:  [x] Procedure Follow-up   [] Office Visit      Jordon Mathews MD, 12/11/2023 at 9:43 AM

## 2023-12-11 NOTE — DISCHARGE INSTRUCTIONS
[address] [facility]  Post Procedure Discharge Instructions        YOU HAVE HAD THE FOLLOWING PROCEDURE:                                  [] Occipital Nerve Blocks  [] CTS wrist injection(s)  [] Knee Injection(s)         [] Shoulder Injection(s)   [] Elbow Injection(s)     [] Botox Injection  [] Cervical Trigger Point Injections    [] Thoracic Trigger Point Injections    [] Lumbar Trigger Point Injections  [] Piriformis Trigger Point Injections  [] SI Joint Injection(s)     [] Trochanteric Bursa Injection(s)       [] Ankle Injection(s)   [] Plantar Fasciitis   []  ______________  Injection(s) [x] Botox [x]  Migraines [] Spasticity    YOU HAVE RECEIVED THE FOLLOWING MEDICATIONS IN YOUR INJECTION(s)  [] Lidocaine [] Bupivacaine   [] DepoMedrol (steroid) [] Decadron (steroid)  []  Kenalog (steroid)   [] Toradol  [] Supartz [] Jacqlyn Hash    [x] Botox        PATIENT INFORMATION:   You may experience the following symptoms after your procedure. These symptoms are normal and should not cause concern: You may have an increase in your pain. This may last 24 - 48 hours after your procedure. You may have no change in the pain that you had prior to your injection(s). You may have weakness or numbness in your affected extremity. If this occurs, this may last until numbing the medication wears off. REPORT THE FOLLOWING SYMPTOMS TO YOUR DOCTOR:  Redness, swelling or drainage at the injection site(s)  Unusual pain that interferes with your normal activities of daily living. OTHER INSTRUCTIONS:    [] I will apply ice to the injection site(s) for at least 24 hours after the procedure. I will rotate the ice on for 20 minutes and off for 20 minutes for at least 24 hours. [] I will not apply heat for at least 48 hours and I will not take a hot bath or shower for at least 24 hours.      [] I understand that if Lidocaine or Bupivacaine was used in my injection(s) that the injection site(s) will

## 2024-01-03 ENCOUNTER — APPOINTMENT (OUTPATIENT)
Dept: URBAN - METROPOLITAN AREA CLINIC 298 | Age: 49
Setting detail: DERMATOLOGY
End: 2024-01-03

## 2024-01-03 DIAGNOSIS — Z41.9 ENCOUNTER FOR PROCEDURE FOR PURPOSES OTHER THAN REMEDYING HEALTH STATE, UNSPECIFIED: ICD-10-CM

## 2024-01-03 PROCEDURE — OTHER FILLERS: OTHER

## 2024-01-03 PROCEDURE — OTHER FACIAL: OTHER

## 2024-01-03 ASSESSMENT — LOCATION DETAILED DESCRIPTION DERM
LOCATION DETAILED: LEFT MEDIAL FOREHEAD
LOCATION DETAILED: RIGHT SUPERIOR MEDIAL BUCCAL CHEEK
LOCATION DETAILED: LEFT LOWER CUTANEOUS LIP
LOCATION DETAILED: LEFT INFERIOR CENTRAL MALAR CHEEK

## 2024-01-03 ASSESSMENT — LOCATION SIMPLE DESCRIPTION DERM
LOCATION SIMPLE: LEFT LIP
LOCATION SIMPLE: RIGHT CHEEK
LOCATION SIMPLE: LEFT CHEEK
LOCATION SIMPLE: LEFT FOREHEAD

## 2024-01-03 ASSESSMENT — LOCATION ZONE DERM
LOCATION ZONE: FACE
LOCATION ZONE: LIP

## 2024-01-03 NOTE — PROCEDURE: FILLERS
[place] Filler Volume In Cc: 0
Map Statment: See Attach Map for Complete Details
Additional Anesthesia Volume In Cc: 6
Additional Area 1 Location: lina oral
Include Cannula Information In Note?: No
Expiration Date (Month Year): 04/08/2025
Aspiration Statement: Aspiration was performed prior to injecting site with filler.
Filler: Restylane-L
Additional Area 1 Location: above lip
Cheeks Filler Volume In Cc: 0.1
Anesthesia Type: 1% lidocaine with epinephrine
Lot #: [id number]
Detail Level: Detailed
Anesthesia Volume In Cc: 0.5
Expiration Date (Month Year): 10/31/2025comped filler
Consent: Written consent obtained. Risks include but not limited to bruising, beading, irregular texture, ulceration, infection, allergic reaction, scar formation, incomplete augmentation, temporary nature, and procedural pain.
Post-Care Instructions: After the procedure, patient instructed to apply ice to reduce swelling.
Lot #: [id number]

## 2024-01-03 NOTE — PROCEDURE: FACIAL
Facial Steaming: steamed
Exfoliation Type: scrub
Price (Use Numbers Only, No Special Characters Or $): 100
Extraction Method: extractor
Comments (Sticky): Facial with dermaplane
Detail Level: Zone

## 2024-01-26 NOTE — TELEPHONE ENCOUNTER
Requested Prescriptions     Pending Prescriptions Disp Refills    Onabotulinumtoxin A (BOTOX) 200 units injection [Pharmacy Med Name: BOTOX 200 UNIT SDV PWD] 1 each 3     Sig: INJECT 155 UNITS INTO THE MUSCLE ONCE FOR 1 DOSE FOR CHRONIC MIGRAINE. DISCARD UNUSED PORTION.       Last Office Visit: 6/27/2023  Next Office Visit: 3/5/2024  Last Medication Refill: 7/26/2023 with 3 RF

## 2024-02-07 ENCOUNTER — OFFICE VISIT (OUTPATIENT)
Dept: SURGERY | Facility: CLINIC | Age: 49
End: 2024-02-07
Payer: COMMERCIAL

## 2024-02-07 VITALS
OXYGEN SATURATION: 95 % | WEIGHT: 185.6 LBS | HEIGHT: 71 IN | DIASTOLIC BLOOD PRESSURE: 81 MMHG | SYSTOLIC BLOOD PRESSURE: 121 MMHG | HEART RATE: 90 BPM | BODY MASS INDEX: 25.98 KG/M2

## 2024-02-07 DIAGNOSIS — E66.3 OVERWEIGHT (BMI 25.0-29.9): ICD-10-CM

## 2024-02-07 DIAGNOSIS — N63.20 MASS OF LEFT BREAST, UNSPECIFIED QUADRANT: Primary | ICD-10-CM

## 2024-02-07 DIAGNOSIS — F41.9 ANXIETY DISORDER, UNSPECIFIED TYPE: ICD-10-CM

## 2024-02-07 PROCEDURE — 99204 OFFICE O/P NEW MOD 45 MIN: CPT | Performed by: STUDENT IN AN ORGANIZED HEALTH CARE EDUCATION/TRAINING PROGRAM

## 2024-02-07 RX ORDER — SEMAGLUTIDE 0.68 MG/ML
0.5 INJECTION, SOLUTION SUBCUTANEOUS WEEKLY
COMMUNITY

## 2024-02-07 RX ORDER — SECUKINUMAB 150 MG/ML
INJECTION SUBCUTANEOUS
COMMUNITY
Start: 2024-01-15

## 2024-02-10 ENCOUNTER — DOCUMENTATION (OUTPATIENT)
Dept: SURGERY | Facility: CLINIC | Age: 49
End: 2024-02-10
Payer: COMMERCIAL

## 2024-02-10 RX ORDER — HEPARIN SODIUM 5000 [USP'U]/ML
5000 INJECTION, SOLUTION INTRAVENOUS; SUBCUTANEOUS ONCE
OUTPATIENT
Start: 2024-02-10 | End: 2024-02-10

## 2024-02-12 ENCOUNTER — TELEPHONE (OUTPATIENT)
Dept: SURGERY | Facility: CLINIC | Age: 49
End: 2024-02-12
Payer: COMMERCIAL

## 2024-02-12 ENCOUNTER — PREP FOR SURGERY (OUTPATIENT)
Dept: OTHER | Facility: HOSPITAL | Age: 49
End: 2024-02-12
Payer: COMMERCIAL

## 2024-02-12 DIAGNOSIS — N63.20 MASS OF LEFT BREAST, UNSPECIFIED QUADRANT: Primary | ICD-10-CM

## 2024-02-21 ENCOUNTER — HOSPITAL ENCOUNTER (OUTPATIENT)
Dept: ULTRASOUND IMAGING | Facility: HOSPITAL | Age: 49
Discharge: HOME | End: 2024-02-21
Admitting: STUDENT IN AN ORGANIZED HEALTH CARE EDUCATION/TRAINING PROGRAM
Payer: COMMERCIAL

## 2024-02-21 DIAGNOSIS — N63.20 MASS OF LEFT BREAST, UNSPECIFIED QUADRANT: Primary | ICD-10-CM

## 2024-02-21 DIAGNOSIS — N63.20 MASS OF LEFT BREAST, UNSPECIFIED QUADRANT: ICD-10-CM

## 2024-02-21 PROCEDURE — 76642 ULTRASOUND BREAST LIMITED: CPT

## 2024-02-21 PROCEDURE — 76882 US LMTD JT/FCL EVL NVASC XTR: CPT

## 2024-03-05 ENCOUNTER — TELEPHONE (OUTPATIENT)
Dept: NEUROLOGY | Age: 49
End: 2024-03-05

## 2024-03-05 ENCOUNTER — OFFICE VISIT (OUTPATIENT)
Dept: NEUROLOGY | Age: 49
End: 2024-03-05
Payer: COMMERCIAL

## 2024-03-05 VITALS
SYSTOLIC BLOOD PRESSURE: 122 MMHG | DIASTOLIC BLOOD PRESSURE: 79 MMHG | WEIGHT: 212 LBS | HEIGHT: 71 IN | BODY MASS INDEX: 29.68 KG/M2 | HEART RATE: 81 BPM

## 2024-03-05 DIAGNOSIS — M54.2 PAIN, NECK: ICD-10-CM

## 2024-03-05 DIAGNOSIS — H53.149 PHOTOPHOBIA: ICD-10-CM

## 2024-03-05 DIAGNOSIS — G43.719 INTRACTABLE CHRONIC MIGRAINE WITHOUT AURA AND WITHOUT STATUS MIGRAINOSUS: Primary | ICD-10-CM

## 2024-03-05 PROCEDURE — 99214 OFFICE O/P EST MOD 30 MIN: CPT | Performed by: PSYCHIATRY & NEUROLOGY

## 2024-03-05 RX ORDER — SEMAGLUTIDE 0.68 MG/ML
0.5 INJECTION, SOLUTION SUBCUTANEOUS WEEKLY
COMMUNITY
Start: 2023-04-26

## 2024-03-05 RX ORDER — ATOGEPANT 60 MG/1
60 TABLET ORAL DAILY
Qty: 30 TABLET | Refills: 11 | Status: SHIPPED | OUTPATIENT
Start: 2024-03-05

## 2024-03-05 RX ORDER — METHOCARBAMOL 750 MG/1
750 TABLET, FILM COATED ORAL 4 TIMES DAILY
COMMUNITY

## 2024-03-05 RX ORDER — CHLORPROMAZINE HYDROCHLORIDE 25 MG/1
25 TABLET, FILM COATED ORAL EVERY EVENING
Qty: 30 TABLET | Refills: 5 | Status: SHIPPED | OUTPATIENT
Start: 2024-03-05

## 2024-03-05 RX ORDER — DIPHENHYDRAMINE HCL 25 MG
25 CAPSULE ORAL NIGHTLY PRN
Qty: 30 CAPSULE | Refills: 5 | Status: SHIPPED | OUTPATIENT
Start: 2024-03-05 | End: 2024-04-04

## 2024-03-05 RX ORDER — PROMETHAZINE HYDROCHLORIDE 25 MG/1
25 TABLET ORAL EVERY EVENING
Qty: 30 TABLET | Refills: 5 | Status: SHIPPED | OUTPATIENT
Start: 2024-03-05 | End: 2024-04-04

## 2024-03-05 NOTE — TELEPHONE ENCOUNTER
CATHERINE EMERSON (Key: [id number])  PA Case ID #: [id number]  Rx #: [id number]  Need Help? Call us at [phone]  Status  Sent to Plan today  Drug  Qulipta 60MG tablets    Form  [employer] Electronic PA Form (2017 NCPDP)  Original Claim Info  76,[id number]    Your information has been submitted to [facility]. To check for an updated outcome later, reopen this PA request from your dashboard.  If [facility] has not responded to your request within 24 hours, contact [facility] at [phone]. If you think there may be a problem with your PA request, use our live chat feature at the bottom right.      I called the patient and left her a voicemail letting her know it was submitted and we are waiting to hear back from her insurance company and she can check with her pharmacy in a few days to see if it will go through.

## 2024-03-05 NOTE — PROGRESS NOTES
Review of Systems    Constitutional - No fever or chills.  No diaphoresis or significant fatigue.  HENT -  No tinnitus or significant hearing loss.  Eyes - no sudden vision change or eye pain  Respiratory - no significant shortness of breath or cough  Cardiovascular - no chest pain No palpitations or significant leg swelling  Gastrointestinal - no abdominal swelling or pain.    Genitourinary - No difficulty urinating, dysuria  Musculoskeletal - no back pain or myalgia.  Skin - no color change or rash  Neurologic - No seizures.  No lateralizing weakness.  Hematologic - no easy bruising or excessive bleeding.  Psychiatric - no severe anxiety or nervousness.   All other review of systems are negative.    
chronic migraine without aura and without status migrainosus 04/16/2021     Resolved Ambulatory Problems     Diagnosis Date Noted    No Resolved Ambulatory Problems     Past Medical History:   Diagnosis Date    Diabetes (HCC)     Lupus (HCC)     Migraine        Past Surgical History:   Procedure Laterality Date    CHOLECYSTECTOMY         No family history on file.    Allergies   Allergen Reactions    Codeine        Social History     Socioeconomic History    Marital status:      Spouse name: Not on file    Number of children: Not on file    Years of education: Not on file    Highest education level: Not on file   Occupational History    Not on file   Tobacco Use    Smoking status: Every Day     Current packs/day: 0.25     Types: Cigarettes    Smokeless tobacco: Never   Substance and Sexual Activity    Alcohol use: Yes    Drug use: Not on file    Sexual activity: Not on file   Other Topics Concern    Not on file   Social History Narrative    Not on file     Social Determinants of Health     Financial Resource Strain: Not on file   Food Insecurity: Not on file   Transportation Needs: Not on file   Physical Activity: Not on file   Stress: Not on file   Social Connections: Not on file   Intimate Partner Violence: Not on file   Housing Stability: Not on file     Review of Systems     Constitutional - No fever or chills.  No diaphoresis or significant fatigue.  HENT -  No tinnitus or significant hearing loss.  Eyes - no sudden vision change or eye pain  Respiratory - no significant shortness of breath or cough  Cardiovascular - no chest pain No palpitations or significant leg swelling  Gastrointestinal - no abdominal swelling or pain.    Genitourinary - No difficulty urinating, dysuria  Musculoskeletal - no back pain or myalgia.  Skin - no color change or rash  Neurologic - No seizures.  No lateralizing weakness.  Hematologic - no easy bruising or excessive bleeding.  Psychiatric - no severe anxiety or nervousness.

## 2024-03-11 ENCOUNTER — HOSPITAL ENCOUNTER (OUTPATIENT)
Dept: PAIN MANAGEMENT | Age: 49
Discharge: HOME | End: 2024-03-11
Payer: COMMERCIAL

## 2024-03-11 VITALS
HEART RATE: 67 BPM | OXYGEN SATURATION: 98 % | TEMPERATURE: 96.4 F | DIASTOLIC BLOOD PRESSURE: 69 MMHG | RESPIRATION RATE: 18 BRPM | SYSTOLIC BLOOD PRESSURE: 96 MMHG

## 2024-03-11 PROCEDURE — 64615 CHEMODENERV MUSC MIGRAINE: CPT

## 2024-03-11 PROCEDURE — 64615 CHEMODENERV MUSC MIGRAINE: CPT | Performed by: PSYCHIATRY & NEUROLOGY

## 2024-03-11 PROCEDURE — A4216 STERILE WATER/SALINE, 10 ML: HCPCS

## 2024-03-11 PROCEDURE — 2580000003 HC RX 258

## 2024-03-11 RX ORDER — SODIUM CHLORIDE 9 MG/ML
5 INJECTION, SOLUTION INTRAMUSCULAR; INTRAVENOUS; SUBCUTANEOUS ONCE
Status: DISCONTINUED | OUTPATIENT
Start: 2024-03-11 | End: 2024-03-13 | Stop reason: HOSPADM

## 2024-03-11 NOTE — DISCHARGE INSTRUCTIONS
[facility]   [address]  [facility] [place] [address]  [phone] p  [phone]    Procedure:  Level of Consciousness: [x]Alert [x]Oriented []Disoriented []Lethargic  Anxiety Level: [x]Calm []Anxious []Depressed []Other  Skin: []Warm [x]Dry []Cool []Moist []Intact []Other  Cardiovascular: [x]Palpitations: [x]Never []Occasionally []Frequently  Chest Pain: [x]No []Yes  Respiratory:  [x]Unlabored []Labored []Cough ([] Productive []Unproductive)  HCG Required: [x]No []Yes   Results: []Negative []Positive  Knowledge Level:        [x]Patient/Other verbalized understanding of pre-procedure instructions.        [x]Assessment of post-op care needs (transportation, responsible caregiver)        [x]Able to discuss health care problems and how to deal with it.  Factors that Affect Teaching:        Language Barrier: [x]No []Yes - why:        Hearing Loss:        [x]No []Yes            Corrective Device:  []Yes []No        Vision Loss:           [x]No []Yes            Corrective Device:  []Yes []No        Memory Loss:       [x]No []Yes            []Short Term []Long Term  Motivational Level:  [x]Asks Questions                  []Extremely Anxious       [x]Seems Interested               []Seems Uninterested                  []Denies need for Education  Risk for Injury:  [x]Patient oriented to person, place and time  [x]History of frequent falls/loss of balance  Nutritional:  []Change in appetite   []Weight Gain   []Weight Loss  Functional:  []Requires assistance with ADL's      Migraine  Follow up Assessment:  Patient experiences 11 headaches per month  Patient states that he/she has 0 headaches out of 30 days each month.  Patient states that he/she has 11 migraines out of 30 days each month.  Patient has experienced a  reduction in Migraine headaches less than 15 days per month [x]Yes []No  Patient has experienced a reduction in Migraine hours [x]Yes []No

## 2024-03-11 NOTE — PROCEDURES
to arise between today and their next appointment.    Plan:  [] Will return to the office in   [] 1 month  [] 4 - 6 weeks  [] 8 weeks   [x] 12 weeks:  [x] Procedure Follow-up   [] Office Visit      Olegario Rodas MD, 3/11/2024 at 10:07 AM

## 2024-04-16 ENCOUNTER — APPOINTMENT (OUTPATIENT)
Dept: URBAN - METROPOLITAN AREA CLINIC 298 | Age: 49
Setting detail: DERMATOLOGY
End: 2024-04-17

## 2024-04-16 VITALS — RESPIRATION RATE: 18 BRPM | HEIGHT: 55 IN | WEIGHT: 170 LBS

## 2024-04-16 DIAGNOSIS — L40.8 OTHER PSORIASIS: ICD-10-CM

## 2024-04-16 DIAGNOSIS — L40.59 OTHER PSORIATIC ARTHROPATHY: ICD-10-CM

## 2024-04-16 DIAGNOSIS — L40.0 PSORIASIS VULGARIS: ICD-10-CM

## 2024-04-16 PROCEDURE — OTHER COUNSELING: OTHER

## 2024-04-16 PROCEDURE — OTHER MIPS QUALITY: OTHER

## 2024-04-16 PROCEDURE — 99214 OFFICE O/P EST MOD 30 MIN: CPT

## 2024-04-16 PROCEDURE — OTHER MEDICATION COUNSELING: OTHER

## 2024-04-16 PROCEDURE — OTHER PRESCRIPTION: OTHER

## 2024-04-16 PROCEDURE — OTHER PRESCRIPTION MEDICATION MANAGEMENT: OTHER

## 2024-04-16 RX ORDER — HYDROCORTISONE 25 MG/G
CREAM TOPICAL
Qty: 30 | Refills: 4 | Status: ERX | COMMUNITY
Start: 2024-04-16

## 2024-04-16 RX ORDER — CLOBETASOL PROPIONATE 0.5 MG/ML
SOLUTION TOPICAL QHS
Qty: 50 | Refills: 5 | Status: ERX | COMMUNITY
Start: 2024-04-16

## 2024-04-16 RX ORDER — CLOBETASOL PROPIONATE 0.5 MG/G
CREAM TOPICAL BID
Qty: 60 | Refills: 5 | Status: ERX | COMMUNITY
Start: 2024-04-16

## 2024-04-16 ASSESSMENT — LOCATION SIMPLE DESCRIPTION DERM
LOCATION SIMPLE: INFERIOR FOREHEAD
LOCATION SIMPLE: RIGHT HIP
LOCATION SIMPLE: LEFT HAND
LOCATION SIMPLE: RIGHT THUMBNAIL
LOCATION SIMPLE: LEFT RING FINGERNAIL
LOCATION SIMPLE: RIGHT FOOT
LOCATION SIMPLE: LEFT FOOT
LOCATION SIMPLE: RIGHT FOREARM
LOCATION SIMPLE: LEFT SCALP
LOCATION SIMPLE: POSTERIOR SCALP
LOCATION SIMPLE: RIGHT GREAT TOE
LOCATION SIMPLE: RIGHT HAND
LOCATION SIMPLE: RIGHT INDEX FINGERNAIL
LOCATION SIMPLE: LEFT THUMB
LOCATION SIMPLE: LEFT SMALL FINGERNAIL
LOCATION SIMPLE: RIGHT MIDDLE FINGERNAIL
LOCATION SIMPLE: LEFT HIP

## 2024-04-16 ASSESSMENT — LOCATION DETAILED DESCRIPTION DERM
LOCATION DETAILED: INFERIOR MID FOREHEAD
LOCATION DETAILED: MID-OCCIPITAL SCALP
LOCATION DETAILED: LEFT DISTAL ULNAR THUMB
LOCATION DETAILED: RIGHT THUMBNAIL
LOCATION DETAILED: LEFT MEDIAL FRONTAL SCALP
LOCATION DETAILED: RIGHT PROXIMAL DORSAL FOREARM
LOCATION DETAILED: RIGHT FOOT
LOCATION DETAILED: LEFT SMALL FINGERNAIL
LOCATION DETAILED: RIGHT MIDDLE FINGERNAIL
LOCATION DETAILED: LEFT RING FINGERNAIL
LOCATION DETAILED: RIGHT DORSAL GREAT TOE
LOCATION DETAILED: RIGHT HIP JOINT
LOCATION DETAILED: LEFT HIP JOINT
LOCATION DETAILED: RIGHT INDEX FINGERNAIL
LOCATION DETAILED: LEFT HAND
LOCATION DETAILED: LEFT FOOT
LOCATION DETAILED: RIGHT HAND

## 2024-04-16 ASSESSMENT — LOCATION ZONE DERM
LOCATION ZONE: FOOT
LOCATION ZONE: FACE
LOCATION ZONE: HIP
LOCATION ZONE: FINGERNAIL
LOCATION ZONE: HAND
LOCATION ZONE: TOE
LOCATION ZONE: SCALP
LOCATION ZONE: FINGER
LOCATION ZONE: ARM

## 2024-04-16 NOTE — PROCEDURE: MEDICATION COUNSELING
Topical Retinoid counseling:  Patient advised to apply a pea-sized amount only at bedtime and wait 30 minutes after washing their face before applying.  If too drying, patient may add a non-comedogenic moisturizer. The patient verbalized understanding of the proper use and possible adverse effects of retinoids.  All of the patient's questions and concerns were addressed.
Arava Pregnancy And Lactation Text: This medication is Pregnancy Category X and is absolutely contraindicated during pregnancy. It is unknown if it is excreted in breast milk.
Klisyri Pregnancy And Lactation Text: It is unknown if this medication can harm a developing fetus or if it is excreted in breast milk.
Cimetidine Counseling:  I discussed with the patient the risks of Cimetidine including but not limited to gynecomastia, headache, diarrhea, nausea, drowsiness, arrhythmias, pancreatitis, skin rashes, psychosis, bone marrow suppression and kidney toxicity.
Dutasteride Male Counseling: Dustasteride Counseling:  I discussed with the patient the risks of use of dutasteride including but not limited to decreased libido, decreased ejaculate volume, and gynecomastia. Women who can become pregnant should not handle medication.  All of the patient's questions and concerns were addressed.
Fluconazole Pregnancy And Lactation Text: This medication is Pregnancy Category C and it isn't know if it is safe during pregnancy. It is also excreted in breast milk.
Ilumya Pregnancy And Lactation Text: The risk during pregnancy and breastfeeding is uncertain with this medication.
Bexarotene Counseling:  I discussed with the patient the risks of bexarotene including but not limited to hair loss, dry lips/skin/eyes, liver abnormalities, hyperlipidemia, pancreatitis, depression/suicidal ideation, photosensitivity, drug rash/allergic reactions, hypothyroidism, anemia, leukopenia, infection, cataracts, and teratogenicity.  Patient understands that they will need regular blood tests to check lipid profile, liver function tests, white blood cell count, thyroid function tests and pregnancy test if applicable.
Cephalexin Pregnancy And Lactation Text: This medication is Pregnancy Category B and considered safe during pregnancy.  It is also excreted in breast milk but can be used safely for shorter doses.
Gabapentin Counseling: I discussed with the patient the risks of gabapentin including but not limited to dizziness, somnolence, fatigue and ataxia.
Quinolones Counseling:  I discussed with the patient the risks of fluoroquinolones including but not limited to GI upset, allergic reaction, drug rash, diarrhea, dizziness, photosensitivity, yeast infections, liver function test abnormalities, tendonitis/tendon rupture.
Detail Level: Simple
Topical Metronidazole Pregnancy And Lactation Text: This medication is Pregnancy Category B and considered safe during pregnancy.  It is also considered safe to use while breastfeeding.
Nsaids Counseling: NSAID Counseling: I discussed with the patient that NSAIDs should be taken with food. Prolonged use of NSAIDs can result in the development of stomach ulcers.  Patient advised to stop taking NSAIDs if abdominal pain occurs.  The patient verbalized understanding of the proper use and possible adverse effects of NSAIDs.  All of the patient's questions and concerns were addressed.
Skyrizi Counseling: I discussed with the patient the risks of risankizumab-rzaa including but not limited to immunosuppression, and serious infections.  The patient understands that monitoring is required including a PPD at baseline and must alert us or the primary physician if symptoms of infection or other concerning signs are noted.
Protopic Counseling: Patient may experience a mild burning sensation during topical application. Protopic is not approved in children less than 2 years of age. There have been case reports of hematologic and skin malignancies in patients using topical calcineurin inhibitors although causality is questionable.
SSKI Counseling:  I discussed with the patient the risks of SSKI including but not limited to thyroid abnormalities, metallic taste, GI upset, fever, headache, acne, arthralgias, paraesthesias, lymphadenopathy, easy bleeding, arrhythmias, and allergic reaction.
Azelaic Acid Pregnancy And Lactation Text: This medication is considered safe during pregnancy and breast feeding.
Cosentyx Pregnancy And Lactation Text: This medication is Pregnancy Category B and is considered safe during pregnancy. It is unknown if this medication is excreted in breast milk.
Methotrexate Pregnancy And Lactation Text: This medication is Pregnancy Category X and is known to cause fetal harm. This medication is excreted in breast milk.
VTAMA Counseling: I discussed with the patient that VTAMA is not for use in the eyes, mouth or mouth. They should call the office if they develop any signs of allergic reactions to VTAMA. The patient verbalized understanding of the proper use and possible adverse effects of VTAMA.  All of the patient's questions and concerns were addressed.
Rinvoq Counseling: I discussed with the patient the risks of Rinvoq therapy including but not limited to upper respiratory tract infections, shingles, cold sores, bronchitis, nausea, cough, fever, acne, and headache. Live vaccines should be avoided.  This medication has been linked to serious infections; higher rate of mortality; malignancy and lymphoproliferative disorders; major adverse cardiovascular events; thrombosis; thrombocytopenia, anemia, and neutropenia; lipid elevations; liver enzyme elevations; and gastrointestinal perforations.
Soolantra Pregnancy And Lactation Text: This medication is Pregnancy Category C. This medication is considered safe during breast feeding.
Arava Counseling:  Patient counseled regarding adverse effects of Arava including but not limited to nausea, vomiting, abnormalities in liver function tests. Patients may develop mouth sores, rash, diarrhea, and abnormalities in blood counts. The patient understands that monitoring is required including LFTs and blood counts.  There is a rare possibility of scarring of the liver and lung problems that can occur when taking methotrexate. Persistent nausea, loss of appetite, pale stools, dark urine, cough, and shortness of breath should be reported immediately. Patient advised to discontinue Arava treatment and consult with a physician prior to attempting conception. The patient will have to undergo a treatment to eliminate Arava from the body prior to conception.
Klisyri Counseling:  I discussed with the patient the risks of Klisyri including but not limited to erythema, scaling, itching, weeping, crusting, and pain.
Ivermectin Counseling:  Patient instructed to take medication on an empty stomach with a full glass of water.  Patient informed of potential adverse effects including but not limited to nausea, diarrhea, dizziness, itching, and swelling of the extremities or lymph nodes.  The patient verbalized understanding of the proper use and possible adverse effects of ivermectin.  All of the patient's questions and concerns were addressed.
Dutasteride Female Counseling: Dutasteride Counseling:  I discussed with the patient the risks of use of dutasteride including but not limited to decreased libido and sexual dysfunction. Explained the teratogenic nature of the medication and stressed the importance of not getting pregnant during treatment. All of the patient's questions and concerns were addressed.
Dapsone Pregnancy And Lactation Text: This medication is Pregnancy Category C and is not considered safe during pregnancy or breast feeding.
Ilumya Counseling: I discussed with the patient the risks of tildrakizumab including but not limited to immunosuppression, malignancy, posterior leukoencephalopathy syndrome, and serious infections.  The patient understands that monitoring is required including a PPD at baseline and must alert us or the primary physician if symptoms of infection or other concerning signs are noted.
Clindamycin Counseling: I counseled the patient regarding use of clindamycin as an antibiotic for prophylactic and/or therapeutic purposes. Clindamycin is active against numerous classes of bacteria, including skin bacteria. Side effects may include nausea, diarrhea, gastrointestinal upset, rash, hives, yeast infections, and in rare cases, colitis.
Bexarotene Pregnancy And Lactation Text: This medication is Pregnancy Category X and should not be given to women who are pregnant or may become pregnant. This medication should not be used if you are breast feeding.
Azathioprine Counseling:  I discussed with the patient the risks of azathioprine including but not limited to myelosuppression, immunosuppression, hepatotoxicity, lymphoma, and infections.  The patient understands that monitoring is required including baseline LFTs, Creatinine, possible TPMP genotyping and weekly CBCs for the first month and then every 2 weeks thereafter.  The patient verbalized understanding of the proper use and possible adverse effects of azathioprine.  All of the patient's questions and concerns were addressed.
Fluconazole Counseling:  Patient counseled regarding adverse effects of fluconazole including but not limited to headache, diarrhea, nausea, upset stomach, liver function test abnormalities, taste disturbance, and stomach pain.  There is a rare possibility of liver failure that can occur when taking fluconazole.  The patient understands that monitoring of LFTs and kidney function test may be required, especially at baseline. The patient verbalized understanding of the proper use and possible adverse effects of fluconazole.  All of the patient's questions and concerns were addressed.
Topical Metronidazole Counseling: Metronidazole is a topical antibiotic medication. You may experience burning, stinging, redness, or allergic reactions.  Please call our office if you develop any problems from using this medication.
Nsaids Pregnancy And Lactation Text: These medications are considered safe up to 30 weeks gestation. It is excreted in breast milk.
Tremfya Counseling: I discussed with the patient the risks of guselkumab including but not limited to immunosuppression, serious infections, and drug reactions.  The patient understands that monitoring is required including a PPD at baseline and must alert us or the primary physician if symptoms of infection or other concerning signs are noted.
Picato Pregnancy And Lactation Text: This medication is Pregnancy Category C. It is unknown if this medication is excreted in breast milk.
Minocycline Pregnancy And Lactation Text: This medication is Pregnancy Category D and not consider safe during pregnancy. It is also excreted in breast milk.
Terbinafine Pregnancy And Lactation Text: This medication is Pregnancy Category B and is considered safe during pregnancy. It is also excreted in breast milk and breast feeding isn't recommended.
Include Pregnancy/Lactation Warning?: No
Sski Pregnancy And Lactation Text: This medication is Pregnancy Category D and isn't considered safe during pregnancy. It is excreted in breast milk.
Cosentyx Counseling:  I discussed with the patient the risks of Cosentyx including but not limited to worsening of Crohn's disease, immunosuppression, allergic reactions and infections.  The patient understands that monitoring is required including a PPD at baseline and must alert us or the primary physician if symptoms of infection or other concerning signs are noted.
Prednisone Counseling:  I discussed with the patient the risks of prolonged use of prednisone including but not limited to weight gain, insomnia, osteoporosis, mood changes, diabetes, susceptibility to infection, glaucoma and high blood pressure.  In cases where prednisone use is prolonged, patients should be monitored with blood pressure checks, serum glucose levels and an eye exam.  Additionally, the patient may need to be placed on GI prophylaxis, PCP prophylaxis, and calcium and vitamin D supplementation and/or a bisphosphonate.  The patient verbalized understanding of the proper use and the possible adverse effects of prednisone.  All of the patient's questions and concerns were addressed.
Benzoyl Peroxide Counseling: Patient counseled that medicine may cause skin irritation and bleach clothing.  In the event of skin irritation, the patient was advised to reduce the amount of the drug applied or use it less frequently.   The patient verbalized understanding of the proper use and possible adverse effects of benzoyl peroxide.  All of the patient's questions and concerns were addressed.
Winlevi Pregnancy And Lactation Text: This medication is considered safe during pregnancy and breastfeeding.
Drysol Counseling:  I discussed with the patient the risks of drysol/aluminum chloride including but not limited to skin rash, itching, irritation, burning.
Soolantra Counseling: I discussed with the patients the risks of topial Soolantra. This is a medicine which decreases the number of mites and inflammation in the skin. You experience burning, stinging, eye irritation or allergic reactions.  Please call our office if you develop any problems from using this medication.
Rinvoq Pregnancy And Lactation Text: Based on animal studies, Rinvoq may cause embryo-fetal harm when administered to pregnant women.  The medication should not be used in pregnancy.  Breastfeeding is not recommended during treatment and for 6 days after the last dose.
Albendazole Pregnancy And Lactation Text: This medication is Pregnancy Category C and it isn't known if it is safe during pregnancy. It is also excreted in breast milk.
Clindamycin Pregnancy And Lactation Text: This medication can be used in pregnancy if certain situations. Clindamycin is also present in breast milk.
Dutasteride Pregnancy And Lactation Text: This medication is absolutely contraindicated in women, especially during pregnancy and breast feeding. Feminization of male fetuses is possible if taking while pregnant.
Isotretinoin Counseling: Patient should get monthly blood tests, not donate blood, not drive at night if vision affected, not share medication, and not undergo elective surgery for 6 months after tx completed. Side effects reviewed, pt to contact office should one occur.
Dapsone Counseling: I discussed with the patient the risks of dapsone including but not limited to hemolytic anemia, agranulocytosis, rashes, methemoglobinemia, kidney failure, peripheral neuropathy, headaches, GI upset, and liver toxicity.  Patients who start dapsone require monitoring including baseline LFTs and weekly CBCs for the first month, then every month thereafter.  The patient verbalized understanding of the proper use and possible adverse effects of dapsone.  All of the patient's questions and concerns were addressed.
Azathioprine Pregnancy And Lactation Text: This medication is Pregnancy Category D and isn't considered safe during pregnancy. It is unknown if this medication is excreted in breast milk.
Topical Ketoconazole Pregnancy And Lactation Text: This medication is Pregnancy Category B and is considered safe during pregnancy. It is unknown if it is excreted in breast milk.
Odomzo Counseling- I discussed with the patient the risks of Odomzo including but not limited to nausea, vomiting, diarrhea, constipation, weight loss, changes in the sense of taste, decreased appetite, muscle spasms, and hair loss.  The patient verbalized understanding of the proper use and possible adverse effects of Odomzo.  All of the patient's questions and concerns were addressed.
Olanzapine Counseling- I discussed with the patient the common side effects of olanzapine including but are not limited to: lack of energy, dry mouth, increased appetite, sleepiness, tremor, constipation, dizziness, changes in behavior, or restlessness.  Explained that teenagers are more likely to experience headaches, abdominal pain, pain in the arms or legs, tiredness, and sleepiness.  Serious side effects include but are not limited: increased risk of death in elderly patients who are confused, have memory loss, or dementia-related psychosis; hyperglycemia; increased cholesterol and triglycerides; and weight gain.
Picato Counseling:  I discussed with the patient the risks of Picato including but not limited to erythema, scaling, itching, weeping, crusting, and pain.
Minocycline Counseling: Patient advised regarding possible photosensitivity and discoloration of the teeth, skin, lips, tongue and gums.  Patient instructed to avoid sunlight, if possible.  When exposed to sunlight, patients should wear protective clothing, sunglasses, and sunscreen.  The patient was instructed to call the office immediately if the following severe adverse effects occur:  hearing changes, easy bruising/bleeding, severe headache, or vision changes.  The patient verbalized understanding of the proper use and possible adverse effects of minocycline.  All of the patient's questions and concerns were addressed.
Simponi Counseling:  I discussed with the patient the risks of golimumab including but not limited to myelosuppression, immunosuppression, autoimmune hepatitis, demyelinating diseases, lymphoma, and serious infections.  The patient understands that monitoring is required including a PPD at baseline and must alert us or the primary physician if symptoms of infection or other concerning signs are noted.
Terbinafine Counseling: Patient counseling regarding adverse effects of terbinafine including but not limited to headache, diarrhea, rash, upset stomach, liver function test abnormalities, itching, taste/smell disturbance, nausea, abdominal pain, and flatulence.  There is a rare possibility of liver failure that can occur when taking terbinafine.  The patient understands that a baseline LFT and kidney function test may be required. The patient verbalized understanding of the proper use and possible adverse effects of terbinafine.  All of the patient's questions and concerns were addressed.
Cimzia Pregnancy And Lactation Text: This medication crosses the placenta but can be considered safe in certain situations. Cimzia may be excreted in breast milk.
Opioid Pregnancy And Lactation Text: These medications can lead to premature delivery and should be avoided during pregnancy. These medications are also present in breast milk in small amounts.
Prednisone Pregnancy And Lactation Text: This medication is Pregnancy Category C and it isn't know if it is safe during pregnancy. This medication is excreted in breast milk.
Benzoyl Peroxide Pregnancy And Lactation Text: This medication is Pregnancy Category C. It is unknown if benzoyl peroxide is excreted in breast milk.
Winlevi Counseling:  I discussed with the patient the risks of topical clascoterone including but not limited to erythema, scaling, itching, and stinging. Patient voiced their understanding.
Thalidomide Counseling: I discussed with the patient the risks of thalidomide including but not limited to birth defects, anxiety, weakness, chest pain, dizziness, cough and severe allergy.
5-Fu Pregnancy And Lactation Text: This medication is Pregnancy Category X and contraindicated in pregnancy and in women who may become pregnant. It is unknown if this medication is excreted in breast milk.
Solaraze Pregnancy And Lactation Text: This medication is Pregnancy Category B and is considered safe. There is some data to suggest avoiding during the third trimester. It is unknown if this medication is excreted in breast milk.
Albendazole Counseling:  I discussed with the patient the risks of albendazole including but not limited to cytopenia, kidney damage, nausea/vomiting and severe allergy.  The patient understands that this medication is being used in an off-label manner.
Sotyktu Counseling:  I discussed the most common side effects of Sotyktu including: common cold, sore throat, sinus infections, cold sores, canker sores, folliculitis, and acne.  I also discussed more serious side effects of Sotyktu including but not limited to: serious allergic reactions; increased risk for infections such as TB; cancers such as lymphomas; rhabdomyolysis and elevated CPK; and elevated triglycerides and liver enzymes. 
Doxycycline Counseling:  Patient counseled regarding possible photosensitivity and increased risk for sunburn.  Patient instructed to avoid sunlight, if possible.  When exposed to sunlight, patients should wear protective clothing, sunglasses, and sunscreen.  The patient was instructed to call the office immediately if the following severe adverse effects occur:  hearing changes, easy bruising/bleeding, severe headache, or vision changes.  The patient verbalized understanding of the proper use and possible adverse effects of doxycycline.  All of the patient's questions and concerns were addressed.
Finasteride Male Counseling: Finasteride Counseling:  I discussed with the patient the risks of use of finasteride including but not limited to decreased libido, decreased ejaculate volume, gynecomastia, and depression. Women should not handle medication.  All of the patient's questions and concerns were addressed.
Colchicine Pregnancy And Lactation Text: This medication is Pregnancy Category C and isn't considered safe during pregnancy. It is excreted in breast milk.
Hyrimoz Counseling:  I discussed with the patient the risks of adalimumab including but not limited to myelosuppression, immunosuppression, autoimmune hepatitis, demyelinating diseases, lymphoma, and serious infections.  The patient understands that monitoring is required including a PPD at baseline and must alert us or the primary physician if symptoms of infection or other concerning signs are noted.
Isotretinoin Pregnancy And Lactation Text: This medication is Pregnancy Category X and is considered extremely dangerous during pregnancy. It is unknown if it is excreted in breast milk.
Cellcept Counseling:  I discussed with the patient the risks of mycophenolate mofetil including but not limited to infection/immunosuppression, GI upset, hypokalemia, hypercholesterolemia, bone marrow suppression, lymphoproliferative disorders, malignancy, GI ulceration/bleed/perforation, colitis, interstitial lung disease, kidney failure, progressive multifocal leukoencephalopathy, and birth defects.  The patient understands that monitoring is required including a baseline creatinine and regular CBC testing. In addition, patient must alert us immediately if symptoms of infection or other concerning signs are noted.
Imiquimod Counseling:  I discussed with the patient the risks of imiquimod including but not limited to erythema, scaling, itching, weeping, crusting, and pain.  Patient understands that the inflammatory response to imiquimod is variable from person to person and was educated regarded proper titration schedule.  If flu-like symptoms develop, patient knows to discontinue the medication and contact us.
Topical Ketoconazole Counseling: Patient counseled that this medication may cause skin irritation or allergic reactions.  In the event of skin irritation, the patient was advised to reduce the amount of the drug applied or use it less frequently.   The patient verbalized understanding of the proper use and possible adverse effects of ketoconazole.  All of the patient's questions and concerns were addressed.
Libtayo Pregnancy And Lactation Text: This medication is contraindicated in pregnancy and when breast feeding.
Olanzapine Pregnancy And Lactation Text: This medication is pregnancy category C.   There are no adequate and well controlled trials with olanzapine in pregnant females.  Olanzapine should be used during pregnancy only if the potential benefit justifies the potential risk to the fetus.   In a study in lactating healthy women, olanzapine was excreted in breast milk.  It is recommended that women taking olanzapine should not breast feed.
Metronidazole Pregnancy And Lactation Text: This medication is Pregnancy Category B and considered safe during pregnancy.  It is also excreted in breast milk.
Ketoconazole Pregnancy And Lactation Text: This medication is Pregnancy Category C and it isn't know if it is safe during pregnancy. It is also excreted in breast milk and breast feeding isn't recommended.
Carac Counseling:  I discussed with the patient the risks of Carac including but not limited to erythema, scaling, itching, weeping, crusting, and pain.
Opioid Counseling: I discussed with the patient the potential side effects of opioids including but not limited to addiction, altered mental status, and depression. I stressed avoiding alcohol, benzodiazepines, muscle relaxants and sleep aids unless specifically okayed by a physician. The patient verbalized understanding of the proper use and possible adverse effects of opioids. All of the patient's questions and concerns were addressed. They were instructed to flush the remaining pills down the toilet if they did not need them for pain.
Opzelura Pregnancy And Lactation Text: There is insufficient data to evaluate drug-associated risk for major birth defects, miscarriage, or other adverse maternal or fetal outcomes.  There is a pregnancy registry that monitors pregnancy outcomes in pregnant persons exposed to the medication during pregnancy.  It is unknown if this medication is excreted in breast milk.  Do not breastfeed during treatment and for about 4 weeks after the last dose.
Hydroxychloroquine Counseling:  I discussed with the patient that a baseline ophthalmologic exam is needed at the start of therapy and every year thereafter while on therapy. A CBC may also be warranted for monitoring.  The side effects of this medication were discussed with the patient, including but not limited to agranulocytosis, aplastic anemia, seizures, rashes, retinopathy, and liver toxicity. Patient instructed to call the office should any adverse effect occur.  The patient verbalized understanding of the proper use and possible adverse effects of Plaquenil.  All the patient's questions and concerns were addressed.
Solaraze Counseling:  I discussed with the patient the risks of Solaraze including but not limited to erythema, scaling, itching, weeping, crusting, and pain.
Tetracycline Counseling: Patient counseled regarding possible photosensitivity and increased risk for sunburn.  Patient instructed to avoid sunlight, if possible.  When exposed to sunlight, patients should wear protective clothing, sunglasses, and sunscreen.  The patient was instructed to call the office immediately if the following severe adverse effects occur:  hearing changes, easy bruising/bleeding, severe headache, or vision changes.  The patient verbalized understanding of the proper use and possible adverse effects of tetracycline.  All of the patient's questions and concerns were addressed. Patient understands to avoid pregnancy while on therapy due to potential birth defects.
Cimzia Counseling:  I discussed with the patient the risks of Cimzia including but not limited to immunosuppression, allergic reactions and infections.  The patient understands that monitoring is required including a PPD at baseline and must alert us or the primary physician if symptoms of infection or other concerning signs are noted.
5-Fu Counseling: 5-Fluorouracil Counseling:  I discussed with the patient the risks of 5-fluorouracil including but not limited to erythema, scaling, itching, weeping, crusting, and pain.
Sotyktu Pregnancy And Lactation Text: There is insufficient data to evaluate whether or not Sotyktu is safe to use during pregnancy.   It is not known if Sotyktu passes into breast milk and whether or not it is safe to use when breastfeeding.  
Finasteride Female Counseling: Finasteride Counseling:  I discussed with the patient the risks of use of finasteride including but not limited to decreased libido and sexual dysfunction. Explained the teratogenic nature of the medication and stressed the importance of not getting pregnant during treatment. All of the patient's questions and concerns were addressed.
High Dose Vitamin A Counseling: Side effects reviewed, pt to contact office should one occur.
Colchicine Counseling:  Patient counseled regarding adverse effects including but not limited to stomach upset (nausea, vomiting, stomach pain, or diarrhea).  Patient instructed to limit alcohol consumption while taking this medication.  Colchicine may reduce blood counts especially with prolonged use.  The patient understands that monitoring of kidney function and blood counts may be required, especially at baseline. The patient verbalized understanding of the proper use and possible adverse effects of colchicine.  All of the patient's questions and concerns were addressed.
Hydroquinone Pregnancy And Lactation Text: This medication has not been assigned a Pregnancy Risk Category but animal studies failed to show danger with the topical medication. It is unknown if the medication is excreted in breast milk.
Otezla Counseling: The side effects of Otezla were discussed with the patient, including but not limited to worsening or new depression, weight loss, diarrhea, nausea, upper respiratory tract infection, and headache. Patient instructed to call the office should any adverse effect occur.  The patient verbalized understanding of the proper use and possible adverse effects of Otezla.  All the patient's questions and concerns were addressed.
Cantharidin Pregnancy And Lactation Text: This medication has not been proven safe during pregnancy. It is unknown if this medication is excreted in breast milk.
Doxycycline Pregnancy And Lactation Text: This medication is Pregnancy Category D and not consider safe during pregnancy. It is also excreted in breast milk but is considered safe for shorter treatment courses.
Libtayo Counseling- I discussed with the patient the risks of Libtayo including but not limited to nausea, vomiting, diarrhea, and bone or muscle pain.  The patient verbalized understanding of the proper use and possible adverse effects of Libtayo.  All of the patient's questions and concerns were addressed.
Oral Minoxidil Counseling- I discussed with the patient the risks of oral minoxidil including but not limited to shortness of breath, swelling of the feet or ankles, dizziness, lightheadedness, unwanted hair growth and allergic reaction.  The patient verbalized understanding of the proper use and possible adverse effects of oral minoxidil.  All of the patient's questions and concerns were addressed.
Cibinqo Counseling: I discussed with the patient the risks of Cibinqo therapy including but not limited to common cold, nausea, headache, cold sores, increased blood CPK levels, dizziness, UTIs, fatigue, acne, and vomitting. Live vaccines should be avoided.  This medication has been linked to serious infections; higher rate of mortality; malignancy and lymphoproliferative disorders; major adverse cardiovascular events; thrombosis; thrombocytopenia and lymphopenia; lipid elevations; and retinal detachment.
Azithromycin Counseling:  I discussed with the patient the risks of azithromycin including but not limited to GI upset, allergic reaction, drug rash, diarrhea, and yeast infections.
Siliq Counseling:  I discussed with the patient the risks of Siliq including but not limited to new or worsening depression, suicidal thoughts and behavior, immunosuppression, malignancy, posterior leukoencephalopathy syndrome, and serious infections.  The patient understands that monitoring is required including a PPD at baseline and must alert us or the primary physician if symptoms of infection or other concerning signs are noted. There is also a special program designed to monitor depression which is required with Siliq.
Hydroxyzine Pregnancy And Lactation Text: This medication is not safe during pregnancy and should not be taken. It is also excreted in breast milk and breast feeding isn't recommended.
Ketoconazole Counseling:   Patient counseled regarding improving absorption with orange juice.  Adverse effects include but are not limited to breast enlargement, headache, diarrhea, nausea, upset stomach, liver function test abnormalities, taste disturbance, and stomach pain.  There is a rare possibility of liver failure that can occur when taking ketoconazole. The patient understands that monitoring of LFTs may be required, especially at baseline. The patient verbalized understanding of the proper use and possible adverse effects of ketoconazole.  All of the patient's questions and concerns were addressed.
Wartpeel Counseling:  I discussed with the patient the risks of Wartpeel including but not limited to erythema, scaling, itching, weeping, crusting, and pain.
Opzelura Counseling:  I discussed with the patient the risks of Opzelura including but not limited to nasopharngitis, bronchitis, ear infection, eosinophila, hives, diarrhea, folliculitis, tonsillitis, and rhinorrhea.  Taken orally, this medication has been linked to serious infections; higher rate of mortality; malignancy and lymphoproliferative disorders; major adverse cardiovascular events; thrombosis; thrombocytopenia, anemia, and neutropenia; and lipid elevations.
Tranexamic Acid Counseling:  Patient advised of the small risk of bleeding problems with tranexamic acid. They were also instructed to call if they developed any nausea, vomiting or diarrhea. All of the patient's questions and concerns were addressed.
Hydroxychloroquine Pregnancy And Lactation Text: This medication has been shown to cause fetal harm but it isn't assigned a Pregnancy Risk Category. There are small amounts excreted in breast milk.
Cantharidin Counseling:  I discussed with the patient the risks of Cantharidin including but not limited to pain, redness, burning, itching, and blistering.
Bimzelx Pregnancy And Lactation Text: This medication crosses the placenta and the safety is uncertain during pregnancy. It is unknown if this medication is present in breast milk.
Xeljanz Counseling: I discussed with the patient the risks of Xeljanz therapy including increased risk of infection, liver issues, headache, diarrhea, or cold symptoms. Live vaccines should be avoided. They were instructed to call if they have any problems.
Finasteride Pregnancy And Lactation Text: This medication is absolutely contraindicated during pregnancy. It is unknown if it is excreted in breast milk.
High Dose Vitamin A Pregnancy And Lactation Text: High dose vitamin A therapy is contraindicated during pregnancy and breast feeding.
Humira Counseling:  I discussed with the patient the risks of adalimumab including but not limited to myelosuppression, immunosuppression, autoimmune hepatitis, demyelinating diseases, lymphoma, and serious infections.  The patient understands that monitoring is required including a PPD at baseline and must alert us or the primary physician if symptoms of infection or other concerning signs are noted.
Cyclophosphamide Counseling:  I discussed with the patient the risks of cyclophosphamide including but not limited to hair loss, hormonal abnormalities, decreased fertility, abdominal pain, diarrhea, nausea and vomiting, bone marrow suppression and infection. The patient understands that monitoring is required while taking this medication.
Rhofade Pregnancy And Lactation Text: This medication has not been assigned a Pregnancy Risk Category. It is unknown if the medication is excreted in breast milk.
Otezla Pregnancy And Lactation Text: This medication is Pregnancy Category C and it isn't known if it is safe during pregnancy. It is unknown if it is excreted in breast milk.
Erythromycin Counseling:  I discussed with the patient the risks of erythromycin including but not limited to GI upset, allergic reaction, drug rash, diarrhea, increase in liver enzymes, and yeast infections.
Hydroquinone Counseling:  Patient advised that medication may result in skin irritation, lightening (hypopigmentation), dryness, and burning.  In the event of skin irritation, the patient was advised to reduce the amount of the drug applied or use it less frequently.  Rarely, spots that are treated with hydroquinone can become darker (pseudoochronosis).  Should this occur, patient instructed to stop medication and call the office. The patient verbalized understanding of the proper use and possible adverse effects of hydroquinone.  All of the patient's questions and concerns were addressed.
Topical Clindamycin Counseling: Patient counseled that this medication may cause skin irritation or allergic reactions.  In the event of skin irritation, the patient was advised to reduce the amount of the drug applied or use it less frequently.   The patient verbalized understanding of the proper use and possible adverse effects of clindamycin.  All of the patient's questions and concerns were addressed.
Oral Minoxidil Pregnancy And Lactation Text: This medication should only be used when clearly needed if you are pregnant, attempting to become pregnant or breast feeding.
Calcipotriene Pregnancy And Lactation Text: The use of this medication during pregnancy or lactation is not recommended as there is insufficient data.
Hydroxyzine Counseling: Patient advised that the medication is sedating and not to drive a car after taking this medication.  Patient informed of potential adverse effects including but not limited to dry mouth, urinary retention, and blurry vision.  The patient verbalized understanding of the proper use and possible adverse effects of hydroxyzine.  All of the patient's questions and concerns were addressed.
Cibinqo Pregnancy And Lactation Text: It is unknown if this medication will adversely affect pregnancy or breast feeding.  You should not take this medication if you are currently pregnant or planning a pregnancy or while breastfeeding.
Rituxan Pregnancy And Lactation Text: This medication is Pregnancy Category C and it isn't know if it is safe during pregnancy. It is unknown if this medication is excreted in breast milk but similar antibodies are known to be excreted.
Azithromycin Pregnancy And Lactation Text: This medication is considered safe during pregnancy and is also secreted in breast milk.
Topical Sulfur Applications Pregnancy And Lactation Text: This medication is considered safe during pregnancy and breast feeding secondary to limited systemic absorption.
Tranexamic Acid Pregnancy And Lactation Text: It is unknown if this medication is safe during pregnancy or breast feeding.
Sarecycline Counseling: Patient advised regarding possible photosensitivity and discoloration of the teeth, skin, lips, tongue and gums.  Patient instructed to avoid sunlight, if possible.  When exposed to sunlight, patients should wear protective clothing, sunglasses, and sunscreen.  The patient was instructed to call the office immediately if the following severe adverse effects occur:  hearing changes, easy bruising/bleeding, severe headache, or vision changes.  The patient verbalized understanding of the proper use and possible adverse effects of sarecycline.  All of the patient's questions and concerns were addressed.
Calcipotriene Counseling:  I discussed with the patient the risks of calcipotriene including but not limited to erythema, scaling, itching, and irritation.
Low Dose Naltrexone Counseling- I discussed with the patient the potential risks and side effects of low dose naltrexone including but not limited to: more vivid dreams, headaches, nausea, vomiting, abdominal pain, fatigue, dizziness, and anxiety.
Bimzelx Counseling:  I discussed with the patient the risks of Bimzelx including but not limited to depression, immunosuppression, allergic reactions and infections.  The patient understands that monitoring is required including a PPD at baseline and must alert us or the primary physician if symptoms of infection or other concerning signs are noted.
Taltz Counseling: I discussed with the patient the risks of ixekizumab including but not limited to immunosuppression, serious infections, worsening of inflammatory bowel disease and drug reactions.  The patient understands that monitoring is required including a PPD at baseline and must alert us or the primary physician if symptoms of infection or other concerning signs are noted.
Xelchetz Pregnancy And Lactation Text: This medication is Pregnancy Category D and is not considered safe during pregnancy.  The risk during breast feeding is also uncertain.
Birth Control Pills Counseling: Birth Control Pill Counseling: I discussed with the patient the potential side effects of OCPs including but not limited to increased risk of stroke, heart attack, thrombophlebitis, deep venous thrombosis, hepatic adenomas, breast changes, GI upset, headaches, and depression.  The patient verbalized understanding of the proper use and possible adverse effects of OCPs. All of the patient's questions and concerns were addressed.
Clofazimine Counseling:  I discussed with the patient the risks of clofazimine including but not limited to skin and eye pigmentation, liver damage, nausea/vomiting, gastrointestinal bleeding and allergy.
Rhofade Counseling: Rhofade is a topical medication which can decrease superficial blood flow where applied. Side effects are uncommon and include stinging, redness and allergic reactions.
Zyclara Counseling:  I discussed with the patient the risks of imiquimod including but not limited to erythema, scaling, itching, weeping, crusting, and pain.  Patient understands that the inflammatory response to imiquimod is variable from person to person and was educated regarded proper titration schedule.  If flu-like symptoms develop, patient knows to discontinue the medication and contact us.
Oxybutynin Counseling:  I discussed with the patient the risks of oxybutynin including but not limited to skin rash, drowsiness, dry mouth, difficulty urinating, and blurred vision.
Erythromycin Pregnancy And Lactation Text: This medication is Pregnancy Category B and is considered safe during pregnancy. It is also excreted in breast milk.
Cyclophosphamide Pregnancy And Lactation Text: This medication is Pregnancy Category D and it isn't considered safe during pregnancy. This medication is excreted in breast milk.
Erivedge Counseling- I discussed with the patient the risks of Erivedge including but not limited to nausea, vomiting, diarrhea, constipation, weight loss, changes in the sense of taste, decreased appetite, muscle spasms, and hair loss.  The patient verbalized understanding of the proper use and possible adverse effects of Erivedge.  All of the patient's questions and concerns were addressed.
Litfulo Counseling: I discussed with the patient the risks of Litfulo therapy including but not limited to upper respiratory tract infections, shingles, cold sores, and nausea. Live vaccines should be avoided.  This medication has been linked to serious infections; higher rate of mortality; malignancy and lymphoproliferative disorders; major adverse cardiovascular events; thrombosis; gastrointestinal perforations; neutropenia; lymphopenia; anemia; liver enzyme elevations; and lipid elevations.
Doxepin Pregnancy And Lactation Text: This medication is Pregnancy Category C and it isn't known if it is safe during pregnancy. It is also excreted in breast milk and breast feeding isn't recommended.
Rituxan Counseling:  I discussed with the patient the risks of Rituxan infusions. Side effects can include infusion reactions, severe drug rashes including mucocutaneous reactions, reactivation of latent hepatitis and other infections and rarely progressive multifocal leukoencephalopathy.  All of the patient's questions and concerns were addressed.
Glycopyrrolate Pregnancy And Lactation Text: This medication is Pregnancy Category B and is considered safe during pregnancy. It is unknown if it is excreted breast milk.
Tazorac Pregnancy And Lactation Text: This medication is not safe during pregnancy. It is unknown if this medication is excreted in breast milk.
Mirvaso Counseling: Mirvaso is a topical medication which can decrease superficial blood flow where applied. Side effects are uncommon and include stinging, redness and allergic reactions.
Bactrim Counseling:  I discussed with the patient the risks of sulfa antibiotics including but not limited to GI upset, allergic reaction, drug rash, diarrhea, dizziness, photosensitivity, and yeast infections.  Rarely, more serious reactions can occur including but not limited to aplastic anemia, agranulocytosis, methemoglobinemia, blood dyscrasias, liver or kidney failure, lung infiltrates or desquamative/blistering drug rashes.
Topical Sulfur Applications Counseling: Topical Sulfur Counseling: Patient counseled that this medication may cause skin irritation or allergic reactions.  In the event of skin irritation, the patient was advised to reduce the amount of the drug applied or use it less frequently.   The patient verbalized understanding of the proper use and possible adverse effects of topical sulfur application.  All of the patient's questions and concerns were addressed.
Valtrex Counseling: I discussed with the patient the risks of valacyclovir including but not limited to kidney damage, nausea, vomiting and severe allergy.  The patient understands that if the infection seems to be worsening or is not improving, they are to call.
Low Dose Naltrexone Pregnancy And Lactation Text: Naltrexone is pregnancy category C.  There have been no adequate and well-controlled studies in pregnant women.  It should be used in pregnancy only if the potential benefit justifies the potential risk to the fetus.   Limited data indicates that naltrexone is minimally excreted into breastmilk.
Itraconazole Counseling:  I discussed with the patient the risks of itraconazole including but not limited to liver damage, nausea/vomiting, neuropathy, and severe allergy.  The patient understands that this medication is best absorbed when taken with acidic beverages such as non-diet cola or ginger ale.  The patient understands that monitoring is required including baseline LFTs and repeat LFTs at intervals.  The patient understands that they are to contact us or the primary physician if concerning signs are noted.
Adbry Pregnancy And Lactation Text: It is unknown if this medication will adversely affect pregnancy or breast feeding.
Rifampin Pregnancy And Lactation Text: This medication is Pregnancy Category C and it isn't know if it is safe during pregnancy. It is also excreted in breast milk and should not be used if you are breast feeding.
Birth Control Pills Pregnancy And Lactation Text: This medication should be avoided if pregnant and for the first 30 days post-partum.
Qbrexza Pregnancy And Lactation Text: There is no available data on Qbrexza use in pregnant women.  There is no available data on Qbrexza use in lactation.
Zoryve Pregnancy And Lactation Text: It is unknown if this medication can cause problems during pregnancy and breastfeeding.
Eucrisa Counseling: Patient may experience a mild burning sensation during topical application. Eucrisa is not approved in children less than 3 months of age.
Enbrel Counseling:  I discussed with the patient the risks of etanercept including but not limited to myelosuppression, immunosuppression, autoimmune hepatitis, demyelinating diseases, lymphoma, and infections.  The patient understands that monitoring is required including a PPD at baseline and must alert us or the primary physician if symptoms of infection or other concerning signs are noted.
Metronidazole Counseling:  I discussed with the patient the risks of metronidazole including but not limited to seizures, nausea/vomiting, a metallic taste in the mouth, nausea/vomiting and severe allergy.
Cyclosporine Counseling:  I discussed with the patient the risks of cyclosporine including but not limited to hypertension, gingival hyperplasia,myelosuppression, immunosuppression, liver damage, kidney damage, neurotoxicity, lymphoma, and serious infections. The patient understands that monitoring is required including baseline blood pressure, CBC, CMP, lipid panel and uric acid, and then 1-2 times monthly CMP and blood pressure.
Aklief counseling:  Patient advised to apply a pea-sized amount only at bedtime and wait 30 minutes after washing their face before applying.  If too drying, patient may add a non-comedogenic moisturizer.  The most commonly reported side effects including irritation, redness, scaling, dryness, stinging, burning, itching, and increased risk of sunburn.  The patient verbalized understanding of the proper use and possible adverse effects of retinoids.  All of the patient's questions and concerns were addressed.
Litfulo Pregnancy And Lactation Text: Based on animal studies, Lifulo may cause embryo-fetal harm when administered to pregnant women.  The medication should not be used in pregnancy.  Breastfeeding is not recommended during treatment.
Doxepin Counseling:  Patient advised that the medication is sedating and not to drive a car after taking this medication. Patient informed of potential adverse effects including but not limited to dry mouth, urinary retention, and blurry vision.  The patient verbalized understanding of the proper use and possible adverse effects of doxepin.  All of the patient's questions and concerns were addressed.
Glycopyrrolate Counseling:  I discussed with the patient the risks of glycopyrrolate including but not limited to skin rash, drowsiness, dry mouth, difficulty urinating, and blurred vision.
Acitretin Counseling:  I discussed with the patient the risks of acitretin including but not limited to hair loss, dry lips/skin/eyes, liver damage, hyperlipidemia, depression/suicidal ideation, photosensitivity.  Serious rare side effects can include but are not limited to pancreatitis, pseudotumor cerebri, bony changes, clot formation/stroke/heart attack.  Patient understands that alcohol is contraindicated since it can result in liver toxicity and significantly prolong the elimination of the drug by many years.
Tazorac Counseling:  Patient advised that medication is irritating and drying.  Patient may need to apply sparingly and wash off after an hour before eventually leaving it on overnight.  The patient verbalized understanding of the proper use and possible adverse effects of tazorac.  All of the patient's questions and concerns were addressed.
Bactrim Pregnancy And Lactation Text: This medication is Pregnancy Category D and is known to cause fetal risk.  It is also excreted in breast milk.
Valtrex Pregnancy And Lactation Text: this medication is Pregnancy Category B and is considered safe during pregnancy. This medication is not directly found in breast milk but it's metabolite acyclovir is present.
Griseofulvin Pregnancy And Lactation Text: This medication is Pregnancy Category X and is known to cause serious birth defects. It is unknown if this medication is excreted in breast milk but breast feeding should be avoided.
Adbry Counseling: I discussed with the patient the risks of tralokinumab including but not limited to eye infection and irritation, cold sores, injection site reactions, worsening of asthma, allergic reactions and increased risk of parasitic infection.  Live vaccines should be avoided while taking tralokinumab. The patient understands that monitoring is required and they must alert us or the primary physician if symptoms of infection or other concerning signs are noted.
Rifampin Counseling: I discussed with the patient the risks of rifampin including but not limited to liver damage, kidney damage, red-orange body fluids, nausea/vomiting and severe allergy.
Stelara Counseling:  I discussed with the patient the risks of ustekinumab including but not limited to immunosuppression, malignancy, posterior leukoencephalopathy syndrome, and serious infections.  The patient understands that monitoring is required including a PPD at baseline and must alert us or the primary physician if symptoms of infection or other concerning signs are noted.
Topical Steroids Applications Pregnancy And Lactation Text: Most topical steroids are considered safe to use during pregnancy and lactation.  Any topical steroid applied to the breast or nipple should be washed off before breastfeeding.
Niacinamide Counseling: I recommended taking niacin or niacinamide, also know as vitamin B3, twice daily. Recent evidence suggests that taking vitamin B3 (500 mg twice daily) can reduce the risk of actinic keratoses and non-melanoma skin cancers. Side effects of vitamin B3 include flushing and headache.
Qbrexza Counseling:  I discussed with the patient the risks of Qbrexza including but not limited to headache, mydriasis, blurred vision, dry eyes, nasal dryness, dry mouth, dry throat, dry skin, urinary hesitation, and constipation.  Local skin reactions including erythema, burning, stinging, and itching can also occur.
Xolair Pregnancy And Lactation Text: This medication is Pregnancy Category B and is considered safe during pregnancy. This medication is excreted in breast milk.
Zoryve Counseling:  I discussed with the patient that Zoryve is not for use in the eyes, mouth or vagina. The most commonly reported side effects include diarrhea, headache, insomnia, application site pain, upper respiratory tract infections, and urinary tract infections.  All of the patient's questions and concerns were addressed.
Propranolol Counseling:  I discussed with the patient the risks of propranolol including but not limited to low heart rate, low blood pressure, low blood sugar, restlessness and increased cold sensitivity. They should call the office if they experience any of these side effects.
Spironolactone Counseling: Patient advised regarding risks of diarrhea, abdominal pain, hyperkalemia, birth defects (for female patients), liver toxicity and renal toxicity. The patient may need blood work to monitor liver and kidney function and potassium levels while on therapy. The patient verbalized understanding of the proper use and possible adverse effects of spironolactone.  All of the patient's questions and concerns were addressed.
Dupixent Pregnancy And Lactation Text: This medication likely crosses the placenta but the risk for the fetus is uncertain. This medication is excreted in breast milk.
Aklief Pregnancy And Lactation Text: It is unknown if this medication is safe to use during pregnancy.  It is unknown if this medication is excreted in breast milk.  Breastfeeding women should use the topical cream on the smallest area of the skin for the shortest time needed while breastfeeding.  Do not apply to nipple and areola.
Olumiant Counseling: I discussed with the patient the risks of Olumiant therapy including but not limited to upper respiratory tract infections, shingles, cold sores, and nausea. Live vaccines should be avoided.  This medication has been linked to serious infections; higher rate of mortality; malignancy and lymphoproliferative disorders; major adverse cardiovascular events; thrombosis; gastrointestinal perforations; neutropenia; lymphopenia; anemia; liver enzyme elevations; and lipid elevations.
Acitretin Pregnancy And Lactation Text: This medication is Pregnancy Category X and should not be given to women who are pregnant or may become pregnant in the future. This medication is excreted in breast milk.
Cephalexin Counseling: I counseled the patient regarding use of cephalexin as an antibiotic for prophylactic and/or therapeutic purposes. Cephalexin (commonly prescribed under brand name Keflex) is a cephalosporin antibiotic which is active against numerous classes of bacteria, including most skin bacteria. Side effects may include nausea, diarrhea, gastrointestinal upset, rash, hives, yeast infections, and in rare cases, hepatitis, kidney disease, seizures, fever, confusion, neurologic symptoms, and others. Patients with severe allergies to penicillin medications are cautioned that there is about a 10% incidence of cross-reactivity with cephalosporins. When possible, patients with penicillin allergies should use alternatives to cephalosporins for antibiotic therapy.
Minoxidil Counseling: Minoxidil is a topical medication which can increase blood flow where it is applied. It is uncertain how this medication increases hair growth. Side effects are uncommon and include stinging and allergic reactions.
Griseofulvin Counseling:  I discussed with the patient the risks of griseofulvin including but not limited to photosensitivity, cytopenia, liver damage, nausea/vomiting and severe allergy.  The patient understands that this medication is best absorbed when taken with a fatty meal (e.g., ice cream or french fries).
Infliximab Counseling:  I discussed with the patient the risks of infliximab including but not limited to myelosuppression, immunosuppression, autoimmune hepatitis, demyelinating diseases, lymphoma, and serious infections.  The patient understands that monitoring is required including a PPD at baseline and must alert us or the primary physician if symptoms of infection or other concerning signs are noted.
Topical Steroids Counseling: I discussed with the patient that prolonged use of topical steroids can result in the increased appearance of superficial blood vessels (telangiectasias), lightening (hypopigmentation) and thinning of the skin (atrophy).  Patient understands to avoid using high potency steroids in skin folds, the groin or the face.  The patient verbalized understanding of the proper use and possible adverse effects of topical steroids.  All of the patient's questions and concerns were addressed.
Niacinamide Pregnancy And Lactation Text: These medications are considered safe during pregnancy.
Xolair Counseling:  Patient informed of potential adverse effects including but not limited to fever, muscle aches, rash and allergic reactions.  The patient verbalized understanding of the proper use and possible adverse effects of Xolair.  All of the patient's questions and concerns were addressed.
Protopic Pregnancy And Lactation Text: This medication is Pregnancy Category C. It is unknown if this medication is excreted in breast milk when applied topically.
Propranolol Pregnancy And Lactation Text: This medication is Pregnancy Category C and it isn't known if it is safe during pregnancy. It is excreted in breast milk.
Spironolactone Pregnancy And Lactation Text: This medication can cause feminization of the male fetus and should be avoided during pregnancy. The active metabolite is also found in breast milk.
Azelaic Acid Counseling: Patient counseled that medicine may cause skin irritation and to avoid applying near the eyes.  In the event of skin irritation, the patient was advised to reduce the amount of the drug applied or use it less frequently.   The patient verbalized understanding of the proper use and possible adverse effects of azelaic acid.  All of the patient's questions and concerns were addressed.
Dupixent Counseling: I discussed with the patient the risks of dupilumab including but not limited to eye infection and irritation, cold sores, injection site reactions, worsening of asthma, allergic reactions and increased risk of parasitic infection.  Live vaccines should be avoided while taking dupilumab. Dupilumab will also interact with certain medications such as warfarin and cyclosporine. The patient understands that monitoring is required and they must alert us or the primary physician if symptoms of infection or other concerning signs are noted.
Elidel Counseling: Patient may experience a mild burning sensation during topical application. Elidel is not approved in children less than 2 years of age. There have been case reports of hematologic and skin malignancies in patients using topical calcineurin inhibitors although causality is questionable.
Methotrexate Counseling:  Patient counseled regarding adverse effects of methotrexate including but not limited to nausea, vomiting, abnormalities in liver function tests. Patients may develop mouth sores, rash, diarrhea, and abnormalities in blood counts. The patient understands that monitoring is required including LFT's and blood counts.  There is a rare possibility of scarring of the liver and lung problems that can occur when taking methotrexate. Persistent nausea, loss of appetite, pale stools, dark urine, cough, and shortness of breath should be reported immediately. Patient advised to discontinue methotrexate treatment at least three months before attempting to become pregnant.  I discussed the need for folate supplements while taking methotrexate.  These supplements can decrease side effects during methotrexate treatment. The patient verbalized understanding of the proper use and possible adverse effects of methotrexate.  All of the patient's questions and concerns were addressed.
Olumiant Pregnancy And Lactation Text: Based on animal studies, Olumiant may cause embryo-fetal harm when administered to pregnant women.  The medication should not be used in pregnancy.  Breastfeeding is not recommended during treatment.

## 2024-04-16 NOTE — PROCEDURE: PRESCRIPTION MEDICATION MANAGEMENT
Continue Regimen: Cosentyx as directed
Plan: Patient's nails are much better. She is not in pain and is not losing large pieces of nails. \\nPatient is currently paused on Cosentyx due to undergoing evaluation for possible breast malignancy. Will reach out to Cosentyx medical liaison for further guidance.
Render In Strict Bullet Format?: No
Detail Level: Simple
Modify Regimen: hold cosentyx (see above)
Detail Level: Zone
Initiate Treatment: Clobetasol cream, apply to affected areas of rash twice a day x 2 weeks, stop x 1 week, repeat cycle as needed \\nClobetasol scalp solution, apply to affected areas of rash twice a day x 2 weeks, stop x 1 week, repeat cycle as needed \\nDesonide cream, apply to affected areas of face twice a day x 1 week, stop x 1 week, repeat cycle as needed
Plan: Return to clinic in 6 months.

## 2024-04-16 NOTE — HPI: MEDICATION (COSENTYX)
How Severe Is It?: moderate
Is This A New Presentation, Or A Follow-Up?: Follow Up Coserikax
What Dose Of Cosentyx Are You Taking?: 300mg SC every 4 weeks

## 2024-04-16 NOTE — PROCEDURE: COUNSELING
Detail Level: Zone
Patient Specific Counseling (Will Not Stick From Patient To Patient): arthritis improved

## 2024-05-20 RX ORDER — IBUPROFEN 800 MG/1
800 TABLET ORAL 2 TIMES DAILY PRN
Qty: 90 TABLET | Refills: 10 | Status: SHIPPED | OUTPATIENT
Start: 2024-05-20

## 2024-05-20 NOTE — TELEPHONE ENCOUNTER
Milagro Barry has requested a refill on her medication.      Last office visit : 3/5/2024   Next office visit : 9/4/2024       Requested Prescriptions     Pending Prescriptions Disp Refills    ibuprofen (ADVIL;MOTRIN) 800 MG tablet [Pharmacy Med Name: IBUPROFEN 800MG] 90 tablet 10     Sig: TAKE 1 TABLET BY MOUTH 2 TIMES DAILY AS NEEDED FOR PAIN

## 2024-06-10 ENCOUNTER — HOSPITAL ENCOUNTER (OUTPATIENT)
Dept: PAIN MANAGEMENT | Age: 49
Discharge: HOME | End: 2024-06-10
Payer: COMMERCIAL

## 2024-06-10 VITALS
TEMPERATURE: 96 F | HEART RATE: 68 BPM | OXYGEN SATURATION: 98 % | SYSTOLIC BLOOD PRESSURE: 98 MMHG | RESPIRATION RATE: 18 BRPM | DIASTOLIC BLOOD PRESSURE: 77 MMHG

## 2024-06-10 PROCEDURE — 64615 CHEMODENERV MUSC MIGRAINE: CPT

## 2024-06-10 PROCEDURE — 2580000003 HC RX 258

## 2024-06-10 PROCEDURE — A4216 STERILE WATER/SALINE, 10 ML: HCPCS

## 2024-06-10 PROCEDURE — 64615 CHEMODENERV MUSC MIGRAINE: CPT | Performed by: PSYCHIATRY & NEUROLOGY

## 2024-06-10 RX ORDER — SODIUM CHLORIDE 9 MG/ML
5 INJECTION, SOLUTION INTRAMUSCULAR; INTRAVENOUS; SUBCUTANEOUS ONCE
Status: DISCONTINUED | OUTPATIENT
Start: 2024-06-10 | End: 2024-06-12 | Stop reason: HOSPADM

## 2024-06-10 NOTE — PROGRESS NOTES
[facility]   [address]  [place] [facility] [address]  [phone] p  [phone]    Procedure:  Level of Consciousness: [x]Alert [x]Oriented []Disoriented []Lethargic  Anxiety Level: [x]Calm []Anxious []Depressed []Other  Skin: [x]Warm [x]Dry []Cool []Moist []Intact []Other  Cardiovascular: [x]Palpitations: [x]Never []Occasionally []Frequently  Chest Pain: [x]No []Yes  Respiratory:  [x]Unlabored []Labored []Cough ([] Productive []Unproductive)  HCG Required: [x]No []Yes   Results: []Negative []Positive  Knowledge Level:        [x]Patient/Other verbalized understanding of pre-procedure instructions.        [x]Assessment of post-op care needs (transportation, responsible caregiver)        [x]Able to discuss health care problems and how to deal with it.  Factors that Affect Teaching:        Language Barrier: [x]No []Yes - why:        Hearing Loss:        [x]No []Yes            Corrective Device:  []Yes [x]No        Vision Loss:           [x]No []Yes            Corrective Device:  []Yes [x]No        Memory Loss:       [x]No []Yes            []Short Term []Long Term  Motivational Level:  [x]Asks Questions                  []Extremely Anxious       [x]Seems Interested               []Seems Uninterested                  [x]Denies need for Education  Risk for Injury:  [x]Patient oriented to person, place and time  []History of frequent falls/loss of balance  Nutritional:  []Change in appetite   []Weight Gain   []Weight Loss  Functional:  []Requires assistance with ADL's      Migraine  Follow up Assessment:  Patient experiences 4 headaches per month  Patient states that he/she has 4 headaches out of 30 days each month.  Patient states that he/she has 7 migraines out of 30 days each month.  Patient has experienced a  reduction in Migraine headaches less than 15 days per month [x]Yes []No  Patient has experienced a reduction in Migraine hours [x]Yes []No

## 2024-06-10 NOTE — DISCHARGE INSTRUCTIONS
[facility]  Post Procedure Discharge Instructions        YOU HAVE HAD THE FOLLOWING PROCEDURE:                                  [] Occipital Nerve Blocks  [] CTS wrist injection(s)  [] Knee Injection(s)         [] Shoulder Injection(s)   [] Elbow Injection(s)     [] Botox Injection  [] Cervical Trigger Point Injections    [] Thoracic Trigger Point Injections    [] Lumbar Trigger Point Injections  [] Piriformis Trigger Point Injections  [] SI Joint Injection(s)     [] Trochanteric Bursa Injection(s)       [] Ankle Injection(s)   [] Plantar Fasciitis   []  ______________  Injection(s) [x] Botox [x]  Migraines [] Spasticity    YOU HAVE RECEIVED THE FOLLOWING MEDICATIONS IN YOUR INJECTION(s)  [] Lidocaine [] Bupivacaine   [] DepoMedrol (steroid) [] Decadron (steroid)  []  Kenalog (steroid)   [] Toradol  [] Supartz [] Zilretta    [x] Botox        PATIENT INFORMATION:   You may experience the following symptoms after your procedure. These symptoms are normal and should not cause concern:    You may have an increase in your pain. This may last 24 - 48 hours after your procedure.  You may have no change in the pain that you had prior to your injection(s).  You may have weakness or numbness in your affected extremity. If this occurs, this may last until numbing the medication wears off.     REPORT THE FOLLOWING SYMPTOMS TO YOUR DOCTOR:  Redness, swelling or drainage at the injection site(s)  Unusual pain that interferes with your normal activities of daily living.    OTHER INSTRUCTIONS:    [] I will apply ice to the injection site(s) for at least 24 hours after the procedure. I will rotate the ice on for 20 minutes and off for 20 minutes for at least 24 hours.    [] I will not apply heat for at least 48 hours and I will not take a hot bath or shower for at least 24 hours.     [] I understand that if Lidocaine or Bupivacaine was used in my injection(s) that the injection site(s) will

## 2024-06-10 NOTE — PROCEDURES
instructions.    The patient has been instructed to contact the office should there be any complications or questions to arise between today and their next appointment.    Plan:  [] Will return to the office in   [] 1 month  [] 4 - 6 weeks  [] 8 weeks   [x] 12 weeks:  [x] Procedure Follow-up   [] Office Visit      Olegario Rodas MD, 6/10/2024 at 9:44 AM

## 2024-07-23 ENCOUNTER — OFFICE VISIT (OUTPATIENT)
Dept: ENT CLINIC | Age: 49
End: 2024-07-23
Payer: COMMERCIAL

## 2024-07-23 VITALS
SYSTOLIC BLOOD PRESSURE: 116 MMHG | HEIGHT: 71 IN | WEIGHT: 180 LBS | BODY MASS INDEX: 25.2 KG/M2 | DIASTOLIC BLOOD PRESSURE: 72 MMHG

## 2024-07-23 DIAGNOSIS — Z00.00 NORMAL EXAM: Primary | ICD-10-CM

## 2024-07-23 PROCEDURE — 99202 OFFICE O/P NEW SF 15 MIN: CPT | Performed by: OTOLARYNGOLOGY

## 2024-07-23 ASSESSMENT — ENCOUNTER SYMPTOMS
RESPIRATORY NEGATIVE: 1
ALLERGIC/IMMUNOLOGIC NEGATIVE: 1
GASTROINTESTINAL NEGATIVE: 1
EYES NEGATIVE: 1

## 2024-07-23 NOTE — PROGRESS NOTES
2024    Milagro Barry (:  1975) is a 49 y.o. female, Established patient, here for evaluation of the following chief complaint(s):  New Patient (Tonsils, lump in neck)      Vitals:    24 0809   BP: 116/72   Weight: 81.6 kg (180 lb)   Height: 1.803 m (5' 11\")       Wt Readings from Last 3 Encounters:   24 81.6 kg (180 lb)   24 96.2 kg (212 lb)   23 96.2 kg (212 lb)       BP Readings from Last 3 Encounters:   24 116/72   06/10/24 98/77   24 96/69         SUBJECTIVE/OBJECTIVE:    Patient seen today for her neck and her throat.  She has had a lump in her neck that they have been following and the CT scan did not show anything in her neck.  She says it can get a little smaller at times.  The CT did show some slight asymmetry of her tonsils and she is very concerned about this.  No difficulty swallowing.  She is a smoker but is trying to cut back.        Review of Systems   Constitutional: Negative.    HENT: Negative.     Eyes: Negative.    Respiratory: Negative.     Cardiovascular: Negative.    Gastrointestinal: Negative.    Endocrine: Negative.    Musculoskeletal: Negative.    Skin: Negative.    Allergic/Immunologic: Negative.    Neurological: Negative.    Hematological: Negative.    Psychiatric/Behavioral: Negative.          Physical Exam  Vitals reviewed.   Constitutional:       Appearance: Normal appearance. She is normal weight.   HENT:      Head: Normocephalic and atraumatic.      Right Ear: Tympanic membrane, ear canal and external ear normal.      Left Ear: Tympanic membrane, ear canal and external ear normal.      Nose: Nose normal.      Mouth/Throat:      Mouth: Mucous membranes are moist.      Pharynx: Oropharynx is clear.   Eyes:      Extraocular Movements: Extraocular movements intact.      Pupils: Pupils are equal, round, and reactive to light.   Cardiovascular:      Rate and Rhythm: Normal rate and regular rhythm.   Pulmonary:      Effort:

## 2024-08-14 ENCOUNTER — TELEPHONE (OUTPATIENT)
Dept: PAIN MANAGEMENT | Age: 49
End: 2024-08-14

## 2024-08-14 NOTE — TELEPHONE ENCOUNTER
Called the number on the [facility] form and spoke with Mis. Gave her all the info and she informed me that her authorization  .  I was placed on hold and she came back stating that was not the correct number.  Gave me another number and placed me on hold again. Came back and stated she found another number-[phone]  Patched me through to them

## 2024-08-16 NOTE — PROCEDURE: MEDICATION COUNSELING
History of PTSD. Does tend to stay at home due to anxiety.   - Continued on gabapentin 1200mg TID.   - Continued on ativan 0.5mg daily prn.   - Referral to mental health.    Dutasteride Counseling: Dustasteride Counseling:  I discussed with the patient the risks of use of dutasteride including but not limited to decreased libido, decreased ejaculate volume, and gynecomastia. Women who can become pregnant should not handle medication.  All of the patient's questions and concerns were addressed. Dutasteride Male Counseling: Dustasteride Counseling:  I discussed with the patient the risks of use of dutasteride including but not limited to decreased libido, decreased ejaculate volume, and gynecomastia. Women who can become pregnant should not handle medication.  All of the patient's questions and concerns were addressed.

## 2024-08-20 ENCOUNTER — HOSPITAL ENCOUNTER (OUTPATIENT)
Dept: ULTRASOUND IMAGING | Facility: HOSPITAL | Age: 49
Discharge: HOME | End: 2024-08-20
Admitting: STUDENT IN AN ORGANIZED HEALTH CARE EDUCATION/TRAINING PROGRAM
Payer: COMMERCIAL

## 2024-08-20 DIAGNOSIS — N63.20 MASS OF LEFT BREAST, UNSPECIFIED QUADRANT: ICD-10-CM

## 2024-08-20 PROCEDURE — 76642 ULTRASOUND BREAST LIMITED: CPT

## 2024-08-23 ENCOUNTER — TRANSCRIBE ORDERS (OUTPATIENT)
Dept: ADMINISTRATIVE | Facility: HOSPITAL | Age: 49
End: 2024-08-23

## 2024-08-23 ENCOUNTER — TRANSCRIBE ORDERS (OUTPATIENT)
Dept: ADMINISTRATIVE | Facility: HOSPITAL | Age: 49
End: 2024-08-23
Payer: COMMERCIAL

## 2024-08-23 DIAGNOSIS — Z12.31 ENCOUNTER FOR SCREENING MAMMOGRAM FOR MALIGNANT NEOPLASM OF BREAST: Primary | ICD-10-CM

## 2024-08-26 ENCOUNTER — TRANSCRIBE ORDERS (OUTPATIENT)
Dept: ADMINISTRATIVE | Facility: HOSPITAL | Age: 49
End: 2024-08-26
Payer: COMMERCIAL

## 2024-08-26 ENCOUNTER — TELEPHONE (OUTPATIENT)
Dept: SURGERY | Facility: CLINIC | Age: 49
End: 2024-08-26

## 2024-08-26 DIAGNOSIS — N60.02 SOLITARY CYST OF LEFT BREAST: Primary | ICD-10-CM

## 2024-08-28 ENCOUNTER — TRANSCRIBE ORDERS (OUTPATIENT)
Dept: ADMINISTRATIVE | Facility: HOSPITAL | Age: 49
End: 2024-08-28
Payer: COMMERCIAL

## 2024-08-28 DIAGNOSIS — N60.02 SOLITARY CYST OF BREAST, LEFT: Primary | ICD-10-CM

## 2024-09-04 ENCOUNTER — OFFICE VISIT (OUTPATIENT)
Dept: NEUROLOGY | Age: 49
End: 2024-09-04
Payer: COMMERCIAL

## 2024-09-04 VITALS
HEIGHT: 71 IN | WEIGHT: 176 LBS | SYSTOLIC BLOOD PRESSURE: 110 MMHG | BODY MASS INDEX: 24.64 KG/M2 | DIASTOLIC BLOOD PRESSURE: 84 MMHG

## 2024-09-04 DIAGNOSIS — R11.0 NAUSEA: ICD-10-CM

## 2024-09-04 DIAGNOSIS — H53.149 PHOTOPHOBIA: ICD-10-CM

## 2024-09-04 DIAGNOSIS — G43.719 INTRACTABLE CHRONIC MIGRAINE WITHOUT AURA AND WITHOUT STATUS MIGRAINOSUS: Primary | ICD-10-CM

## 2024-09-04 DIAGNOSIS — M54.2 PAIN, NECK: ICD-10-CM

## 2024-09-04 PROCEDURE — 99213 OFFICE O/P EST LOW 20 MIN: CPT | Performed by: PSYCHIATRY & NEUROLOGY

## 2024-09-04 NOTE — PROGRESS NOTES
Chief Complaint   Patient presents with    Migraine     Pt states she is having word finding difficulties. She also states that she has been biting her tongue often.        Milagro Barry is a 49 y.o. year old female who is seen for evaluation of seen for intractable migraines The patient indicates that she has had migraines since her teens. Over time they have worsened. She indicates since about 2017 her headaches have significantly worsened. She does have significant stress with her kids with genetic disorders and she has been diagnosed with PTSD. She indicates she will get a severe pain over the right eye and orbit and feels like someone is stabbing her. She then has a visual aura of stars and strips which encompasses her visual field and then she loses her peripheral vision. She then has a severe migraine that lasts from 1/2 day to 4 days. She has significant photophobia all the time. She has chronic neck pain as well. She has had an MRI in 2014 and 2019 which had some minimal non specific white matter change. In 2014 she went to [facility] for evaluation of MS has her MRI had the non specific white matter change. Her workup at that time including LP was reportedly unremarkable. She has tried Maxalt which made her migraine 10 x worse. She has tried Topamax , Depakote, Neurontin, Lyrica, Cymbalta and  elavil . 16 days a month bad headaches. Samples of Emgality helped reduced intensity. Lost track of time with headaches. Cocktail and Roxocodone helped.  Previously she had to call crisis center. Gets sudden sharp pain brief.   Ubrevly really helps. If take it early then helps but still with confusion. Trileptal not help. Headaches much better with Botox. Ubrelvy helps. On Qulipta, Motrin  and Botox. Headaches are good. Triggers if avoid can help. 1 1/2 month and a half after to Botox no headaches. Doing better. Overall doing well. Aphasia noted even with migraines     Active Ambulatory Problems

## 2024-09-09 ENCOUNTER — HOSPITAL ENCOUNTER (OUTPATIENT)
Dept: PAIN MANAGEMENT | Age: 49
Discharge: HOME | End: 2024-09-09
Payer: COMMERCIAL

## 2024-09-09 VITALS
OXYGEN SATURATION: 100 % | TEMPERATURE: 96.1 F | DIASTOLIC BLOOD PRESSURE: 56 MMHG | HEART RATE: 71 BPM | SYSTOLIC BLOOD PRESSURE: 123 MMHG | RESPIRATION RATE: 18 BRPM

## 2024-09-09 PROCEDURE — 64615 CHEMODENERV MUSC MIGRAINE: CPT | Performed by: PSYCHIATRY & NEUROLOGY

## 2024-09-09 PROCEDURE — 2580000003 HC RX 258

## 2024-09-09 PROCEDURE — 64615 CHEMODENERV MUSC MIGRAINE: CPT

## 2024-09-09 RX ORDER — SODIUM CHLORIDE 9 MG/ML
4.5 INJECTION, SOLUTION INTRAMUSCULAR; INTRAVENOUS; SUBCUTANEOUS ONCE
Status: DISCONTINUED | OUTPATIENT
Start: 2024-09-09 | End: 2024-09-11 | Stop reason: HOSPADM

## 2024-09-11 ENCOUNTER — OFFICE VISIT (OUTPATIENT)
Dept: SURGERY | Facility: CLINIC | Age: 49
End: 2024-09-11
Payer: COMMERCIAL

## 2024-09-11 VITALS
HEIGHT: 71 IN | WEIGHT: 182 LBS | BODY MASS INDEX: 25.48 KG/M2 | SYSTOLIC BLOOD PRESSURE: 115 MMHG | DIASTOLIC BLOOD PRESSURE: 71 MMHG | OXYGEN SATURATION: 100 % | HEART RATE: 80 BPM

## 2024-09-11 DIAGNOSIS — N60.02 BENIGN CYST OF LEFT BREAST: Primary | ICD-10-CM

## 2024-09-11 PROCEDURE — 99213 OFFICE O/P EST LOW 20 MIN: CPT

## 2024-09-11 RX ORDER — ATOGEPANT 60 MG/1
TABLET ORAL
COMMUNITY

## 2024-09-11 NOTE — PROGRESS NOTES
Office Established Patient Note:     Referring Provider: No ref. provider found    Chief Complaint   Patient presents with    Follow-up     Follow up breast        Subjective .     History of present illness:  Yuri Zimmerman is a 49 y.o. female who presents to the clinic for 6 month breast follow up. She has a history of microcysts of the L breast for which serial ultrasounds have been done to follow. Today she has no concerns with her breasts. She denies palpable masses, nipple discharge, or skin changes of either breast.     BMI is 25.40. She is an every day smoker. She does not take any blood thinners.     Review of Systems    Review of Systems - General ROS: negative  ENT ROS: negative  Respiratory ROS: no cough, shortness of breath, or wheezing  Cardiovascular ROS: no chest pain or dyspnea on exertion  Gastrointestinal ROS: no abdominal pain, change in bowel habits, or black or bloody stools  Genito-Urinary ROS: no dysuria, trouble voiding, or hematuria  Dermatological ROS: negative   Breast ROS: negative for breast lumps  Hematological and Lymphatic ROS: negative  Musculoskeletal ROS: negative   Neurological ROS: no TIA or stroke symptoms    Psychological ROS: negative  Endocrine ROS: negative    History  Past Medical History:   Diagnosis Date    Anxiety     medical fears   ,   Past Surgical History:   Procedure Laterality Date    CYST REMOVAL      GALLBLADDER SURGERY     ,   Family History   Problem Relation Age of Onset    No Known Problems Mother     No Known Problems Father    ,   Social History     Tobacco Use    Smoking status: Every Day     Types: Cigarettes    Smokeless tobacco: Never   Vaping Use    Vaping status: Never Used   Substance Use Topics    Alcohol use: Never    Drug use: Never   , (Not in a hospital admission)   and Allergies:  Codeine and Latex    Current Outpatient Medications:     Atogepant (Qulipta) 60 MG tablet, , Disp: , Rfl:     Objective     Vital Signs   /71 (BP  "Location: Right arm, Patient Position: Sitting, Cuff Size: Adult)   Pulse 80   Ht 180.3 cm (70.98\")   Wt 82.6 kg (182 lb)   SpO2 100%   BMI 25.40 kg/m²      Physical Exam:  General appearance - alert, well appearing, and in no distress  Mental status - normal mood, behavior, speech, dress, motor activity, and thought processes  Eyes - sclera anicteric  Neck - supple, no significant adenopathy  Chest - no tachypnea, retractions or cyanosis  Heart - normal rate and regular rhythm  Breasts - breasts appear normal, no suspicious masses, no skin or nipple changes or axillary nodes  Neurological - alert, oriented, normal speech, no focal findings or movement disorder noted  Extremities - no pedal edema noted  Skin - normal coloration and turgor, no rashes, no suspicious skin lesions noted    Results Review:  Result Review :            US Breast Left Limited (08/20/2024 09:02)   There is a small group of clustered microcysts which has decreased in  size, currently measuring approximately 0.6 x 0.4 x 0.2 cm, compared  with 0.6 x 0.4 x 0.8 cm on the previous ultrasound. No solid component  or architectural distortion is identified. There is no increased  vascularity in this region.     IMPRESSION:  Slight decrease in size of the small group of clustered  microcysts at 9:00 in the left breast, approximately 2 cm from the  nipple. This is most likely benign, repeat ultrasound is recommended in  6 months, at that time, the patient will also be due for bilateral  screening mammograms. BI-RADS Category 3, probably benign.    Assessment & Plan       Diagnoses and all orders for this visit:    1. Benign cyst of left breast (Primary)         Yuri Zimmerman is a 49 y.o. female who presents to the clinic. Her most recent imaging was done on 8/20/24 and showed decreased size of microcysts with BIRADS-3 findings. At this time, the patient is due for bilateral mammogram and L breast ultrasound in 6 months. I have placed the " order for this. Her PCP has placed the order for these and the patient would like to follow up with her in regards to this. She is to call for an appointment if she has any new problems or concerns. She voices understanding and is agreeable to the plan.     Follow up:     BMI is >= 25 and <30. (Overweight) The following options were offered after discussion;: weight loss educational material (shared in after visit summary)    Return if symptoms worsen or fail to improve.        Ambar Cuenca PA-C  09/11/24  12:07 CDT

## 2024-11-12 ENCOUNTER — APPOINTMENT (OUTPATIENT)
Dept: URBAN - METROPOLITAN AREA CLINIC 298 | Age: 49
Setting detail: DERMATOLOGY
End: 2024-11-12

## 2024-11-12 VITALS — RESPIRATION RATE: 18 BRPM | HEIGHT: 55 IN | WEIGHT: 70 LBS

## 2024-11-12 DIAGNOSIS — L40.0 PSORIASIS VULGARIS: ICD-10-CM

## 2024-11-12 DIAGNOSIS — L40.8 OTHER PSORIASIS: ICD-10-CM

## 2024-11-12 DIAGNOSIS — L30.9 DERMATITIS, UNSPECIFIED: ICD-10-CM

## 2024-11-12 PROCEDURE — OTHER COUNSELING: OTHER

## 2024-11-12 PROCEDURE — OTHER MIPS QUALITY: OTHER

## 2024-11-12 PROCEDURE — 99214 OFFICE O/P EST MOD 30 MIN: CPT

## 2024-11-12 PROCEDURE — OTHER ADDITIONAL NOTES: OTHER

## 2024-11-12 PROCEDURE — OTHER MEDICATION COUNSELING: OTHER

## 2024-11-12 PROCEDURE — OTHER PRESCRIPTION MEDICATION MANAGEMENT: OTHER

## 2024-11-12 ASSESSMENT — LOCATION DETAILED DESCRIPTION DERM
LOCATION DETAILED: RIGHT DORSAL GREAT TOE
LOCATION DETAILED: RIGHT INDEX FINGERNAIL
LOCATION DETAILED: RIGHT THUMBNAIL
LOCATION DETAILED: LEFT DISTAL ULNAR THUMB
LOCATION DETAILED: RIGHT BUTTOCK
LOCATION DETAILED: LEFT RING FINGERNAIL
LOCATION DETAILED: RIGHT PROXIMAL DORSAL FOREARM
LOCATION DETAILED: LEFT SMALL FINGERNAIL
LOCATION DETAILED: RIGHT MIDDLE FINGERNAIL

## 2024-11-12 ASSESSMENT — LOCATION ZONE DERM
LOCATION ZONE: FINGER
LOCATION ZONE: ARM
LOCATION ZONE: TRUNK
LOCATION ZONE: FINGERNAIL
LOCATION ZONE: TOE

## 2024-11-12 ASSESSMENT — LOCATION SIMPLE DESCRIPTION DERM
LOCATION SIMPLE: LEFT THUMB
LOCATION SIMPLE: RIGHT FOREARM
LOCATION SIMPLE: RIGHT GREAT TOE
LOCATION SIMPLE: RIGHT THUMBNAIL
LOCATION SIMPLE: RIGHT BUTTOCK
LOCATION SIMPLE: LEFT SMALL FINGERNAIL
LOCATION SIMPLE: RIGHT INDEX FINGERNAIL
LOCATION SIMPLE: RIGHT MIDDLE FINGERNAIL
LOCATION SIMPLE: LEFT RING FINGERNAIL

## 2024-11-12 NOTE — PROCEDURE: PRESCRIPTION MEDICATION MANAGEMENT
Modify Regimen: hold cosentyx (see above)
Render In Strict Bullet Format?: No
Detail Level: Zone
Plan: Return to clinic in 6 months.
Plan: Patient is under observation for mass of unknown origin in her breast. Holding biologic until this is resolved. Nails are beginning to show signs of psoriatic disease again.
Discontinue Regimen: Cosentyx
Continue Regimen: clobetasol to base of nails BID x 1 week, stop x 1 week, repeat

## 2024-11-12 NOTE — PROCEDURE: ADDITIONAL NOTES
Additional Notes: YA REGALADO SWAB taken today.
Render Risk Assessment In Note?: no
Detail Level: Simple

## 2024-11-26 ENCOUNTER — RX ONLY (RX ONLY)
Age: 49
End: 2024-11-26

## 2024-11-26 RX ORDER — VALACYCLOVIR 1 G/1
TABLET, FILM COATED ORAL
Qty: 30 | Refills: 1 | Status: ERX | COMMUNITY
Start: 2024-11-26

## 2024-12-09 ENCOUNTER — HOSPITAL ENCOUNTER (OUTPATIENT)
Dept: PAIN MANAGEMENT | Age: 49
Discharge: HOME | End: 2024-12-09
Payer: COMMERCIAL

## 2024-12-09 VITALS
TEMPERATURE: 97.2 F | DIASTOLIC BLOOD PRESSURE: 88 MMHG | OXYGEN SATURATION: 100 % | SYSTOLIC BLOOD PRESSURE: 102 MMHG | HEART RATE: 70 BPM | RESPIRATION RATE: 20 BRPM

## 2024-12-09 PROCEDURE — 64615 CHEMODENERV MUSC MIGRAINE: CPT | Performed by: PSYCHIATRY & NEUROLOGY

## 2024-12-09 PROCEDURE — 64615 CHEMODENERV MUSC MIGRAINE: CPT

## 2024-12-09 PROCEDURE — 2580000003 HC RX 258

## 2024-12-09 RX ORDER — SODIUM CHLORIDE 9 MG/ML
4.5 INJECTION, SOLUTION INTRAMUSCULAR; INTRAVENOUS; SUBCUTANEOUS ONCE
Status: DISCONTINUED | OUTPATIENT
Start: 2024-12-09 | End: 2024-12-11 | Stop reason: HOSPADM

## 2024-12-09 NOTE — PROCEDURES
PROCEDURE NOTE  Date: 2024   Name: Milagro Barry  YOB: 1975    Procedures        [facility]    Patient Name: Milagro Barry    : 1975                    Age: 49 y.o.    Sex: female    Date: 2024    Pre-op Diagnosis: Chronic Migraines    Post-op Diagnosis: Chronic Migraines    Procedure: Botox injections x 155 units (45 units wasted) for migraine    Performing Procedure:  Dr Olegario Rodas    Patient Vitals for the past 24 hrs:   BP Temp Temp src Pulse Resp SpO2   24 0907 102/88 97.2 °F (36.2 °C) Temporal 70 20 100 %       Previous Injections:  Patient had 7 migraines and 3 headaches over the past month.     At least 100 units or greater of Botox was used. The patient had reduction in migraines and was able to increase their activity level post treatment with Botox    Indications:    Milagro Barry has been treated for problems with chronic migraine headaches. The patient was taken through a conservative course of treatment without complete resolution of symptoms. Botox injection therapy was offered and after the risks and benefits of the procedure were explained to the patient, we had agreed to proceed.    The patient was taken to the procedure room and placed in the seated position. The following muscles were identified using palpation and standard landmarks. These muscles were marked and prepped with alcohol. A total of 155 units were injected after careful aspiration and the following distribution bilaterally:  10 units in 2 sites, procerus 5 units in one site, frontalis 20 units in 4 sites, temporalis 40 unit in 8 sites, occipitals 30 units in 6 sites, cervical paraspinals 20 units in 4 sites, and trapezius 30 units in 6 sites.    Discharge:  The patient tolerated the procedure well. There were no complications during the procedure and the patient was discharged home with discharge

## 2024-12-09 NOTE — PROGRESS NOTES
[facility]   [address]  [facility] [address]  [phone] p  [phone]    Procedure:  Level of Consciousness: [x]Alert [x]Oriented []Disoriented []Lethargic  Anxiety Level: [x]Calm []Anxious []Depressed []Other  Skin: [x]Warm [x]Dry []Cool []Moist []Intact []Other  Cardiovascular: []Palpitations: [x]Never []Occasionally []Frequently  Chest Pain: [x]No []Yes  Respiratory:  [x]Unlabored []Labored []Cough ([] Productive []Unproductive)  HCG Required: [x]No []Yes   Results: []Negative []Positive  Knowledge Level:        [x]Patient/Other verbalized understanding of pre-procedure instructions.        [x]Assessment of post-op care needs (transportation, responsible caregiver)        [x]Able to discuss health care problems and how to deal with it.  Factors that Affect Teaching:        Language Barrier: [x]No []Yes - why:        Hearing Loss:        [x]No []Yes            Corrective Device:  []Yes []No        Vision Loss:           [x]No []Yes            Corrective Device:  []Yes []No        Memory Loss:       [x]No []Yes            []Short Term []Long Term  Motivational Level:  []Asks Questions                  []Extremely Anxious       [x]Seems Interested               []Seems Uninterested                  []Denies need for Education  Risk for Injury:  [x]Patient oriented to person, place and time  []History of frequent falls/loss of balance  Nutritional:  []Change in appetite   []Weight Gain   []Weight Loss  Functional:  []Requires assistance with ADL's      Migraine  Follow up Assessment:  Patient experiences 10* headaches per month  Patient states that he/she has  3 headaches out of 30 days each month.  Patient states that he/she has 7 migraines out of 30 days each month.  Patient has experienced a  reduction in Migraine headaches less than 15 days per month [x]Yes []No  Patient has experienced a reduction in Migraine hours [x]Yes []No

## 2024-12-19 NOTE — PROCEDURE: MEDICATION COUNSELING
Patient returned to unit from [facility] post procedure. Awaiting to clarify discharge/ arrangements.    Benzoyl Peroxide Counseling: Patient counseled that medicine may cause skin irritation and bleach clothing.  In the event of skin irritation, the patient was advised to reduce the amount of the drug applied or use it less frequently.   The patient verbalized understanding of the proper use and possible adverse effects of benzoyl peroxide.  All of the patient's questions and concerns were addressed.

## 2025-02-25 ENCOUNTER — HOSPITAL ENCOUNTER (OUTPATIENT)
Dept: ULTRASOUND IMAGING | Facility: HOSPITAL | Age: 50
Discharge: HOME | End: 2025-02-25
Payer: COMMERCIAL

## 2025-02-25 ENCOUNTER — HOSPITAL ENCOUNTER (OUTPATIENT)
Dept: MAMMOGRAPHY | Facility: HOSPITAL | Age: 50
Discharge: HOME | End: 2025-02-25
Payer: COMMERCIAL

## 2025-02-25 ENCOUNTER — HOSPITAL ENCOUNTER (OUTPATIENT)
Dept: MAMMOGRAPHY | Facility: HOSPITAL | Age: 50
End: 2025-02-25
Payer: COMMERCIAL

## 2025-02-25 DIAGNOSIS — N60.02 SOLITARY CYST OF LEFT BREAST: ICD-10-CM

## 2025-02-25 PROCEDURE — 77067 SCR MAMMO BI INCL CAD: CPT

## 2025-02-25 PROCEDURE — 77063 BREAST TOMOSYNTHESIS BI: CPT

## 2025-02-25 PROCEDURE — 76642 ULTRASOUND BREAST LIMITED: CPT

## 2025-03-05 ENCOUNTER — OFFICE VISIT (OUTPATIENT)
Dept: NEUROLOGY | Age: 50
End: 2025-03-05
Payer: COMMERCIAL

## 2025-03-05 VITALS
HEART RATE: 73 BPM | SYSTOLIC BLOOD PRESSURE: 129 MMHG | WEIGHT: 176 LBS | HEIGHT: 71 IN | DIASTOLIC BLOOD PRESSURE: 75 MMHG | BODY MASS INDEX: 24.64 KG/M2

## 2025-03-05 DIAGNOSIS — G43.719 INTRACTABLE CHRONIC MIGRAINE WITHOUT AURA AND WITHOUT STATUS MIGRAINOSUS: Primary | ICD-10-CM

## 2025-03-05 DIAGNOSIS — H53.149 PHOTOPHOBIA: ICD-10-CM

## 2025-03-05 DIAGNOSIS — M54.2 PAIN, NECK: ICD-10-CM

## 2025-03-05 DIAGNOSIS — R11.0 NAUSEA: ICD-10-CM

## 2025-03-05 PROCEDURE — 99214 OFFICE O/P EST MOD 30 MIN: CPT | Performed by: PSYCHIATRY & NEUROLOGY

## 2025-03-05 RX ORDER — LORAZEPAM 2 MG/1
2 TABLET ORAL
Qty: 2 TABLET | Refills: 0 | Status: SHIPPED | OUTPATIENT
Start: 2025-03-05 | End: 2025-04-04

## 2025-03-05 NOTE — PROGRESS NOTES
Chief Complaint   Patient presents with    Migraine     Pt states her migraines have increased        Milagro BOBBY Jhonny is a 49 y.o. year old female who is seen for evaluation of seen for intractable migraines The patient indicates that she has had migraines since her teens. Over time they have worsened. She indicates since about 2017 her headaches have significantly worsened. She does have significant stress with her kids with genetic disorders and she has been diagnosed with PTSD. She indicates she will get a severe pain over the right eye and orbit and feels like someone is stabbing her. She then has a visual aura of stars and strips which encompasses her visual field and then she loses her peripheral vision. She then has a severe migraine that lasts from 1/2 day to 4 days. She has significant photophobia all the time. She has chronic neck pain as well. She has had an MRI in 2014 and 2019 which had some minimal non specific white matter change. In 2014 she went to [place] for evaluation of MS has her MRI had the non specific white matter change. Her workup at that time including LP was reportedly unremarkable. She has tried Maxalt which made her migraine 10 x worse. She has tried Topamax , Depakote, Neurontin, Lyrica, Cymbalta and  elavil . 16 days a month bad headaches. Samples of Emgality helped reduced intensity. Lost track of time with headaches. Cocktail and Roxocodone helped.  Previously she had to call crisis center. Gets sudden sharp pain brief.   Ubrevly really helps. If take it early then helps but still with confusion. Trileptal not help. Headaches much better with Botox. Ubrelvy helps. On Qulipta, Motrin  and Botox. Headaches are good. Triggers if avoid can help. 1 1/2 month and a half after to Botox no headaches.Aphasia noted even with migraines  More headaches lately.     Active Ambulatory Problems     Diagnosis Date Noted    Migraine without status migrainosus, not intractable 08/20/2019

## 2025-03-10 ENCOUNTER — HOSPITAL ENCOUNTER (OUTPATIENT)
Dept: PAIN MANAGEMENT | Age: 50
Discharge: HOME | End: 2025-03-10
Payer: COMMERCIAL

## 2025-03-10 VITALS
RESPIRATION RATE: 18 BRPM | DIASTOLIC BLOOD PRESSURE: 66 MMHG | OXYGEN SATURATION: 98 % | SYSTOLIC BLOOD PRESSURE: 127 MMHG | HEART RATE: 72 BPM | TEMPERATURE: 95.9 F

## 2025-03-10 PROCEDURE — 64615 CHEMODENERV MUSC MIGRAINE: CPT

## 2025-03-10 PROCEDURE — 2580000003 HC RX 258

## 2025-03-10 PROCEDURE — 64615 CHEMODENERV MUSC MIGRAINE: CPT | Performed by: PSYCHIATRY & NEUROLOGY

## 2025-03-10 RX ORDER — SODIUM CHLORIDE 0.9 % (FLUSH) 0.9 %
10 SYRINGE (ML) INJECTION 2 TIMES DAILY
Status: DISCONTINUED | OUTPATIENT
Start: 2025-03-10 | End: 2025-03-12 | Stop reason: HOSPADM

## 2025-03-10 NOTE — DISCHARGE INSTRUCTIONS
[facility]  Post Procedure Discharge Instructions        YOU HAVE HAD THE FOLLOWING PROCEDURE:                                  [] Occipital Nerve Blocks  [] CTS wrist injection(s)  [] Knee Injection(s)         [] Shoulder Injection(s)   [] Elbow Injection(s)     [x] Botox Injection  [] Cervical Trigger Point Injections    [] Thoracic Trigger Point Injections    [] Lumbar Trigger Point Injections  [] Piriformis Trigger Point Injections  [] SI Joint Injection(s)     [] Trochanteric Bursa Injection(s)       [] Ankle Injection(s)   [] Plantar Fasciitis   []  ______________  Injection(s) [] Botox []  Migraines [] Spasticity    YOU HAVE RECEIVED THE FOLLOWING MEDICATIONS IN YOUR INJECTION(s)  [] Lidocaine [] Bupivacaine   [] DepoMedrol (steroid) [] Decadron (steroid)  []  Kenalog (steroid)   [] Toradol  [] Supartz [] Zilretta    [x] Botox        PATIENT INFORMATION:   You may experience the following symptoms after your procedure. These symptoms are normal and should not cause concern:    You may have an increase in your pain. This may last 24 - 48 hours after your procedure.  You may have no change in the pain that you had prior to your injection(s).  You may have weakness or numbness in your affected extremity. If this occurs, this may last until numbing the medication wears off.     REPORT THE FOLLOWING SYMPTOMS TO YOUR DOCTOR:  Redness, swelling or drainage at the injection site(s)  Unusual pain that interferes with your normal activities of daily living.    OTHER INSTRUCTIONS:    [x] I will apply ice to the injection site(s) for at least 24 hours after the procedure. I will rotate the ice on for 20 minutes and off for 20 minutes for at least 24 hours.    [x] I will not apply heat for at least 48 hours and I will not take a hot bath or shower for at least 24 hours.     [x] I understand that if Lidocaine or Bupivacaine was used in my injection(s) that the injection site(s)

## 2025-03-10 NOTE — PROGRESS NOTES
[facility]   [address]  [facility] [address]  [phone] p  [phone]    Procedure:  Level of Consciousness: [x]Alert [x]Oriented []Disoriented []Lethargic  Anxiety Level: [x]Calm []Anxious []Depressed []Other  Skin: []Warm [x]Dry []Cool []Moist []Intact []Other  Cardiovascular: []Palpitations: [x]Never []Occasionally []Frequently  Chest Pain: [x]No []Yes  Respiratory:  [x]Unlabored []Labored []Cough ([] Productive []Unproductive)  HCG Required: [x]No []Yes   Results: []Negative []Positive  Knowledge Level:        [x]Patient/Other verbalized understanding of pre-procedure instructions.        [x]Assessment of post-op care needs (transportation, responsible caregiver)        [x]Able to discuss health care problems and how to deal with it.  Factors that Affect Teaching:        Language Barrier: [x]No []Yes - why:        Hearing Loss:        [x]No []Yes            Corrective Device:  []Yes []No        Vision Loss:           [x]No []Yes            Corrective Device:  []Yes []No        Memory Loss:       [x]No []Yes            []Short Term []Long Term  Motivational Level:  []Asks Questions                  []Extremely Anxious       []Seems Interested               [x]Seems Uninterested                  [x]Denies need for Education  Risk for Injury:  [x]Patient oriented to person, place and time  []History of frequent falls/loss of balance  Nutritional:  []Change in appetite   []Weight Gain   []Weight Loss  Functional:  []Requires assistance with ADL's      Migraine  Follow up Assessment:  Patient experiences 30 headaches per month  Patient states that he/she has  30 headaches out of 30 days each month.  Patient states that he/she has  30 migraines out of 30 days each month.  Patient has experienced a  reduction in Migraine headaches less than 15 days per month [x]Yes []No  Patient has experienced a reduction in Migraine hours [x]Yes []No

## 2025-03-10 NOTE — PROCEDURES
PROCEDURE NOTE  Date: 3/10/2025   Name: Milagro Barry  YOB: 1975    Procedures        [facility]    Patient Name: Milagro Barry    : 1975                    Age: 49 y.o.    Sex: female    Date: March 10, 2025    Pre-op Diagnosis: Chronic Migraines    Post-op Diagnosis: Chronic Migraines    Procedure: Botox injections x 155 units (45 units wasted) for migraine    Performing Procedure:  Dr Olegario Rodas    Patient Vitals for the past 24 hrs:   BP Temp Temp src Pulse Resp SpO2   03/10/25 0848 127/66 (!) 95.9 °F (35.5 °C) Temporal 72 18 98 %       Previous Injections:  Patient had 30 migraines and 30 headaches over the past month.     At least 100 units or greater of Botox was used. The patient had reduction in migraines and was able to increase their activity level post treatment with Botox    Indications:    Milagro Barry has been treated for problems with chronic migraine headaches. The patient was taken through a conservative course of treatment without complete resolution of symptoms. Botox injection therapy was offered and after the risks and benefits of the procedure were explained to the patient, we had agreed to proceed.    The patient was taken to the procedure room and placed in the seated position. The following muscles were identified using palpation and standard landmarks. These muscles were marked and prepped with alcohol. A total of 155 units were injected after careful aspiration and the following distribution bilaterally:  10 units in 2 sites, procerus 5 units in one site, frontalis 20 units in 4 sites, temporalis 40 unit in 8 sites, occipitals 30 units in 6 sites, cervical paraspinals 20 units in 4 sites, and trapezius 30 units in 6 sites.    Discharge:  The patient tolerated the procedure well. There were no complications during the procedure and the patient was discharged home with discharge

## 2025-03-11 ENCOUNTER — TELEPHONE (OUTPATIENT)
Dept: NEUROLOGY | Age: 50
End: 2025-03-11

## 2025-03-11 NOTE — TELEPHONE ENCOUNTER
Milagro Barry called  requesting to have MRI scheduled at  [facility] in [facility].  to do open MRI. Needs order. Pt canceled MRI that was scheduled at [facility].  Please call pt advise

## 2025-03-17 NOTE — TELEPHONE ENCOUNTER
[facility] Approved  Auth# [id number]  Valid 3/6/25-6/3/25    Faxed order to [facility]  Fax# [phone]  Ph # [phone]

## 2025-04-30 ENCOUNTER — TELEPHONE (OUTPATIENT)
Dept: NEUROLOGY | Age: 50
End: 2025-04-30

## 2025-04-30 NOTE — TELEPHONE ENCOUNTER
Milagro called to get her MRI results I was able to see the results and provided her with the information. Milagro stated that her headaches have not really been given up but she was starting to go to a chiropractor and to a massage therapist to see if she could get relief from them before seeing what else may could be given. Patient also stated she was going try the headache cocktail of the clorpromazine, phenergan and benadryl to see if that would help if there was no change after seeing the chiropractor and massage therapist.

## 2025-05-27 RX ORDER — IBUPROFEN 800 MG/1
800 TABLET, FILM COATED ORAL 2 TIMES DAILY PRN
Qty: 90 TABLET | Refills: 11 | Status: SHIPPED | OUTPATIENT
Start: 2025-05-27

## 2025-05-27 NOTE — TELEPHONE ENCOUNTER
Milagro Barry has requested a refill on her medication.      Last office visit : 3/5/2025   Next office visit : 9/4/2025   Last medication refill : 05/20/2024 R10        Requested Prescriptions     Pending Prescriptions Disp Refills    ibuprofen (ADVIL;MOTRIN) 800 MG tablet [Pharmacy Med Name: IBUPROFEN 800MG] 90 tablet 11     Sig: TAKE 1 TABLET BY MOUTH 2 TIMES DAILY AS NEEDED FOR PAIN

## 2025-06-30 ENCOUNTER — HOSPITAL ENCOUNTER (OUTPATIENT)
Dept: PAIN MANAGEMENT | Age: 50
Discharge: HOME | End: 2025-06-30
Payer: COMMERCIAL

## 2025-06-30 VITALS
TEMPERATURE: 97.4 F | DIASTOLIC BLOOD PRESSURE: 72 MMHG | OXYGEN SATURATION: 97 % | SYSTOLIC BLOOD PRESSURE: 121 MMHG | HEART RATE: 72 BPM | RESPIRATION RATE: 16 BRPM

## 2025-06-30 PROCEDURE — 64615 CHEMODENERV MUSC MIGRAINE: CPT | Performed by: PSYCHIATRY & NEUROLOGY

## 2025-06-30 PROCEDURE — 64615 CHEMODENERV MUSC MIGRAINE: CPT

## 2025-06-30 PROCEDURE — 2500000003 HC RX 250 WO HCPCS

## 2025-06-30 RX ORDER — SODIUM CHLORIDE 9 MG/ML
4.5 INJECTION, SOLUTION INTRAMUSCULAR; INTRAVENOUS; SUBCUTANEOUS ONCE
Status: DISCONTINUED | OUTPATIENT
Start: 2025-06-30 | End: 2025-07-02 | Stop reason: HOSPADM

## 2025-06-30 NOTE — PROCEDURES
PROCEDURE NOTE  Date: 2025   Name: Milagro Barry  YOB: 1975    Procedures    [facility]    Patient Name: Milagro Barry    : 1975                    Age: 49 y.o.    Sex: female    Date: 2025    Pre-op Diagnosis: Chronic Migraines    Post-op Diagnosis: Chronic Migraines    Procedure: Botox injections x 155 units (45 units wasted) for migraine    Performing Procedure:  Dr Olegario Rodas    Patient Vitals for the past 24 hrs:   BP Temp Temp src Pulse Resp SpO2   25 0759 121/72 97.4 °F (36.3 °C) Temporal 72 16 97 %       Previous Injections:  Patient had 4 migraines and 6 headaches over the past month.     At least 100 units or greater of Botox was used. The patient had reduction in migraines and was able to increase their activity level post treatment with Botox    Indications:    Milagro Barry has been treated for problems with chronic migraine headaches. The patient was taken through a conservative course of treatment without complete resolution of symptoms. Botox injection therapy was offered and after the risks and benefits of the procedure were explained to the patient, we had agreed to proceed.    The patient was taken to the procedure room and placed in the seated position. The following muscles were identified using palpation and standard landmarks. These muscles were marked and prepped with alcohol. A total of 155 units were injected after careful aspiration and the following distribution bilaterally:  10 units in 2 sites, procerus 5 units in one site, frontalis 20 units in 4 sites, temporalis 40 unit in 8 sites, occipitals 30 units in 6 sites, cervical paraspinals 20 units in 4 sites, and trapezius 30 units in 6 sites.    Discharge:  The patient tolerated the procedure well. There were no complications during the procedure and the patient was discharged home with discharge

## 2025-06-30 NOTE — PROGRESS NOTES
[facility]   [address]   [facility] [address]  [phone] p  [phone]    Procedure:  Level of Consciousness: [x]Alert [x]Oriented []Disoriented []Lethargic  Anxiety Level: [x]Calm []Anxious []Depressed []Other  Skin: []Warm [x]Dry []Cool []Moist []Intact []Other  Cardiovascular: []Palpitations: [x]Never []Occasionally []Frequently  Chest Pain: [x]No []Yes  Respiratory:  [x]Unlabored []Labored []Cough ([] Productive []Unproductive)  HCG Required: [x]No []Yes   Results: []Negative []Positive  Knowledge Level:        [x]Patient/Other verbalized understanding of pre-procedure instructions.        [x]Assessment of post-op care needs (transportation, responsible caregiver)        [x]Able to discuss health care problems and how to deal with it.  Factors that Affect Teaching:        Language Barrier: [x]No []Yes - why:        Hearing Loss:        [x]No []Yes            Corrective Device:  []Yes []No        Vision Loss:           [x]No []Yes            Corrective Device:  []Yes []No        Memory Loss:       [x]No []Yes            []Short Term []Long Term  Motivational Level:  [x]Asks Questions                  []Extremely Anxious       [x]Seems Interested               []Seems Uninterested                  []Denies need for Education  Risk for Injury:  [x]Patient oriented to person, place and time  []History of frequent falls/loss of balance  Nutritional:  []Change in appetite   []Weight Gain   []Weight Loss  Functional:

## 2025-09-04 ENCOUNTER — OFFICE VISIT (OUTPATIENT)
Dept: NEUROLOGY | Age: 50
End: 2025-09-04
Payer: COMMERCIAL

## 2025-09-04 VITALS
BODY MASS INDEX: 24.64 KG/M2 | DIASTOLIC BLOOD PRESSURE: 78 MMHG | WEIGHT: 176 LBS | HEIGHT: 71 IN | HEART RATE: 81 BPM | SYSTOLIC BLOOD PRESSURE: 135 MMHG

## 2025-09-04 DIAGNOSIS — M54.2 PAIN, NECK: ICD-10-CM

## 2025-09-04 DIAGNOSIS — H53.149 PHOTOPHOBIA: ICD-10-CM

## 2025-09-04 DIAGNOSIS — G43.719 INTRACTABLE CHRONIC MIGRAINE WITHOUT AURA AND WITHOUT STATUS MIGRAINOSUS: Primary | ICD-10-CM

## 2025-09-04 DIAGNOSIS — R11.0 NAUSEA: ICD-10-CM

## 2025-09-04 PROCEDURE — 99214 OFFICE O/P EST MOD 30 MIN: CPT | Performed by: PSYCHIATRY & NEUROLOGY

## 2025-09-04 RX ORDER — ACETAMINOPHEN 325 MG/1
650 TABLET ORAL
OUTPATIENT
Start: 2025-09-04

## 2025-09-04 RX ORDER — ONDANSETRON 2 MG/ML
8 INJECTION INTRAMUSCULAR; INTRAVENOUS
OUTPATIENT
Start: 2025-09-04

## 2025-09-04 RX ORDER — SODIUM CHLORIDE 9 MG/ML
INJECTION, SOLUTION INTRAVENOUS PRN
OUTPATIENT
Start: 2025-09-04

## 2025-09-04 RX ORDER — EPINEPHRINE 1 MG/ML
0.3 INJECTION, SOLUTION, CONCENTRATE INTRAVENOUS PRN
OUTPATIENT
Start: 2025-09-04

## 2025-09-04 RX ORDER — HYDROCORTISONE SODIUM SUCCINATE 100 MG/2ML
100 INJECTION INTRAMUSCULAR; INTRAVENOUS
OUTPATIENT
Start: 2025-09-04

## 2025-09-04 RX ORDER — ALBUTEROL SULFATE 90 UG/1
4 INHALANT RESPIRATORY (INHALATION) PRN
OUTPATIENT
Start: 2025-09-04

## 2025-09-04 RX ORDER — ATOGEPANT 60 MG/1
60 TABLET ORAL DAILY
Qty: 30 TABLET | Refills: 11 | Status: SHIPPED | OUTPATIENT
Start: 2025-09-04

## 2025-09-04 RX ORDER — HEPARIN SODIUM (PORCINE) LOCK FLUSH IV SOLN 100 UNIT/ML 100 UNIT/ML
500 SOLUTION INTRAVENOUS PRN
OUTPATIENT
Start: 2025-09-04

## 2025-09-04 RX ORDER — DIPHENHYDRAMINE HYDROCHLORIDE 50 MG/ML
50 INJECTION, SOLUTION INTRAMUSCULAR; INTRAVENOUS
OUTPATIENT
Start: 2025-09-04

## 2025-09-04 RX ORDER — FAMOTIDINE 10 MG/ML
20 INJECTION, SOLUTION INTRAVENOUS
OUTPATIENT
Start: 2025-09-04

## 2025-09-04 RX ORDER — SODIUM CHLORIDE 0.9 % (FLUSH) 0.9 %
5-40 SYRINGE (ML) INJECTION PRN
OUTPATIENT
Start: 2025-09-04

## 2025-09-04 RX ORDER — SODIUM CHLORIDE 9 MG/ML
5-250 INJECTION, SOLUTION INTRAVENOUS PRN
OUTPATIENT
Start: 2025-09-04